# Patient Record
Sex: MALE | Race: OTHER | HISPANIC OR LATINO | URBAN - METROPOLITAN AREA
[De-identification: names, ages, dates, MRNs, and addresses within clinical notes are randomized per-mention and may not be internally consistent; named-entity substitution may affect disease eponyms.]

---

## 2017-02-03 ENCOUNTER — EMERGENCY (EMERGENCY)
Facility: HOSPITAL | Age: 47
LOS: 1 days | End: 2017-02-03
Admitting: EMERGENCY MEDICINE
Payer: COMMERCIAL

## 2017-02-03 PROCEDURE — 99284 EMERGENCY DEPT VISIT MOD MDM: CPT

## 2017-02-03 PROCEDURE — 16020 DRESS/DEBRID P-THICK BURN S: CPT

## 2017-02-03 PROCEDURE — 99285 EMERGENCY DEPT VISIT HI MDM: CPT | Mod: 25

## 2017-09-20 ENCOUNTER — EMERGENCY (EMERGENCY)
Facility: HOSPITAL | Age: 47
LOS: 1 days | Discharge: HOME | End: 2017-09-20
Attending: EMERGENCY MEDICINE | Admitting: EMERGENCY MEDICINE
Payer: COMMERCIAL

## 2017-09-20 ENCOUNTER — TRANSCRIPTION ENCOUNTER (OUTPATIENT)
Age: 47
End: 2017-09-20

## 2017-09-20 VITALS
DIASTOLIC BLOOD PRESSURE: 92 MMHG | RESPIRATION RATE: 18 BRPM | WEIGHT: 179.9 LBS | OXYGEN SATURATION: 98 % | TEMPERATURE: 97 F | HEART RATE: 70 BPM | SYSTOLIC BLOOD PRESSURE: 143 MMHG

## 2017-09-20 DIAGNOSIS — Z90.49 ACQUIRED ABSENCE OF OTHER SPECIFIED PARTS OF DIGESTIVE TRACT: Chronic | ICD-10-CM

## 2017-09-20 PROCEDURE — 99284 EMERGENCY DEPT VISIT MOD MDM: CPT | Mod: 25

## 2017-09-20 NOTE — ED ADULT NURSE NOTE - OBJECTIVE STATEMENT
Patient a + o x 4, c/o headache since 4pm and 1 episode of emesis,  reports history of migraine, denies other medical history.  Patient denies nuchal rigidity and photophobia. Patient oriented to area, made comfortable, safety maintained, will continue to monitor.

## 2017-09-20 NOTE — ED ADULT NURSE NOTE - CHPI ED SYMPTOMS NEG
no change in level of consciousness/no confusion/no loss of consciousness/no blurred vision/no fever/no dizziness/no weakness

## 2017-09-21 VITALS
RESPIRATION RATE: 18 BRPM | HEART RATE: 65 BPM | TEMPERATURE: 98 F | OXYGEN SATURATION: 98 % | SYSTOLIC BLOOD PRESSURE: 116 MMHG | DIASTOLIC BLOOD PRESSURE: 64 MMHG

## 2017-09-21 PROCEDURE — 99284 EMERGENCY DEPT VISIT MOD MDM: CPT | Mod: 25

## 2017-09-21 PROCEDURE — 96375 TX/PRO/DX INJ NEW DRUG ADDON: CPT

## 2017-09-21 PROCEDURE — 96374 THER/PROPH/DIAG INJ IV PUSH: CPT

## 2017-09-21 RX ORDER — SODIUM CHLORIDE 9 MG/ML
1000 INJECTION INTRAMUSCULAR; INTRAVENOUS; SUBCUTANEOUS
Qty: 0 | Refills: 0 | Status: DISCONTINUED | OUTPATIENT
Start: 2017-09-21 | End: 2017-09-24

## 2017-09-21 RX ORDER — DIPHENHYDRAMINE HCL 50 MG
50 CAPSULE ORAL ONCE
Qty: 0 | Refills: 0 | Status: COMPLETED | OUTPATIENT
Start: 2017-09-21 | End: 2017-09-21

## 2017-09-21 RX ORDER — METOCLOPRAMIDE HCL 10 MG
10 TABLET ORAL ONCE
Qty: 0 | Refills: 0 | Status: COMPLETED | OUTPATIENT
Start: 2017-09-21 | End: 2017-09-21

## 2017-09-21 RX ADMIN — Medication 10 MILLIGRAM(S): at 01:23

## 2017-09-21 RX ADMIN — Medication 50 MILLIGRAM(S): at 01:21

## 2017-09-21 RX ADMIN — SODIUM CHLORIDE 100 MILLILITER(S): 9 INJECTION INTRAMUSCULAR; INTRAVENOUS; SUBCUTANEOUS at 01:00

## 2017-09-21 NOTE — ED PROVIDER NOTE - MEDICAL DECISION MAKING DETAILS
Female patient with complaints of headache consistent with previous migraine episodes. Neuro exam is unremarkable. Will treat for migraine headache and re-evaluate after treatment.

## 2017-09-21 NOTE — ED PROVIDER NOTE - OBJECTIVE STATEMENT
47 y/o female patient with past hx migraine headaches, brought to ED due to headache consistent with prior episodes of migraine headaches. Patient denies any vision or gait problems, fever or stiff neck since symptoms started. She describes occasional vomits since symptom started, which also occurs with her usual migraine headaches.

## 2017-09-21 NOTE — ED PROVIDER NOTE - PHYSICAL EXAMINATION
Gen: AAOX3, NAD, uncomfortable  HEENT: PERRL, EOMI, MOM, supple neck, negative Kernig and Brudzinski signs, no meningismus  Chest: RRR, CTAX2  Abd: BS+, nontender to palpation  Ext: no edema or cyanosis  Neuro: no gross deficits

## 2017-09-21 NOTE — ED PROVIDER NOTE - PROGRESS NOTE DETAILS
Patient was re-evaluated, found in no acute distress, calm, speaking in complete sentences, describing marked improvement of symptoms following treatment at ED. Patient ambulated without difficulty and tolerated oral intake adequately at ED. Recommendations for rest and follow-up with PMD in 48-72 hours were given to patient. Patient is stable of discharge. Will discharge home. Dr. Bishop Parks

## 2017-09-21 NOTE — ED PROVIDER NOTE - CARE PLAN
Principal Discharge DX:	Nonintractable migraine, unspecified migraine type  Instructions for follow-up, activity and diet:	follow-up with PMD in 48-72 hours

## 2017-09-24 ENCOUNTER — TRANSCRIPTION ENCOUNTER (OUTPATIENT)
Age: 47
End: 2017-09-24

## 2018-05-24 ENCOUNTER — EMERGENCY (EMERGENCY)
Facility: HOSPITAL | Age: 48
LOS: 1 days | Discharge: HOME | End: 2018-05-24
Attending: EMERGENCY MEDICINE | Admitting: EMERGENCY MEDICINE
Payer: COMMERCIAL

## 2018-05-24 VITALS
SYSTOLIC BLOOD PRESSURE: 132 MMHG | HEART RATE: 73 BPM | TEMPERATURE: 98 F | RESPIRATION RATE: 16 BRPM | OXYGEN SATURATION: 98 % | DIASTOLIC BLOOD PRESSURE: 82 MMHG

## 2018-05-24 VITALS
DIASTOLIC BLOOD PRESSURE: 69 MMHG | TEMPERATURE: 99 F | OXYGEN SATURATION: 100 % | SYSTOLIC BLOOD PRESSURE: 144 MMHG | HEART RATE: 88 BPM | WEIGHT: 179.9 LBS | HEIGHT: 65 IN | RESPIRATION RATE: 16 BRPM

## 2018-05-24 DIAGNOSIS — Z90.49 ACQUIRED ABSENCE OF OTHER SPECIFIED PARTS OF DIGESTIVE TRACT: Chronic | ICD-10-CM

## 2018-05-24 PROCEDURE — 73140 X-RAY EXAM OF FINGER(S): CPT

## 2018-05-24 PROCEDURE — 73140 X-RAY EXAM OF FINGER(S): CPT | Mod: 26,RT

## 2018-05-24 PROCEDURE — 99283 EMERGENCY DEPT VISIT LOW MDM: CPT | Mod: 25

## 2018-05-24 PROCEDURE — 29130 APPL FINGER SPLINT STATIC: CPT | Mod: F9

## 2018-05-24 PROCEDURE — 29130 APPL FINGER SPLINT STATIC: CPT

## 2018-05-24 RX ORDER — IBUPROFEN 200 MG
600 TABLET ORAL ONCE
Qty: 0 | Refills: 0 | Status: COMPLETED | OUTPATIENT
Start: 2018-05-24 | End: 2018-05-24

## 2018-05-24 RX ADMIN — Medication 600 MILLIGRAM(S): at 15:32

## 2018-05-24 NOTE — ED ADULT NURSE NOTE - RELIEVING FACTORS
Advancement Flap (Single) Text: The defect edges were debeveled with a #15 scalpel blade.  Given the location of the defect and the proximity to free margins a single advancement flap was deemed most appropriate.  Using a sterile surgical marker, an appropriate advancement flap was drawn incorporating the defect and placing the expected incisions within the relaxed skin tension lines where possible.    The area thus outlined was incised deep to adipose tissue with a #15 scalpel blade.  The skin margins were undermined to an appropriate distance in all directions utilizing iris scissors. rest

## 2018-05-24 NOTE — ED PROVIDER NOTE - OBJECTIVE STATEMENT
46 y/o F pt w/ PMHx migraines presents to the ED c/o R 5th finger pain s/p injury today. 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 Pt denies other injury, numbness, tingling or any other complaints at this time.

## 2018-05-24 NOTE — ED ADULT NURSE NOTE - OBJECTIVE STATEMENT
Pt presents with complaint of injury to right 5th finger on a chair prior to arrival. Swelling ecchymosis tenderness noted right 5th digit. Denies other injuries

## 2018-08-08 PROBLEM — G43.909 MIGRAINE, UNSPECIFIED, NOT INTRACTABLE, WITHOUT STATUS MIGRAINOSUS: Chronic | Status: ACTIVE | Noted: 2017-09-20

## 2018-08-20 ENCOUNTER — APPOINTMENT (OUTPATIENT)
Dept: ORTHOPEDIC SURGERY | Facility: CLINIC | Age: 48
End: 2018-08-20
Payer: COMMERCIAL

## 2018-08-20 VITALS — HEIGHT: 65 IN | BODY MASS INDEX: 29.99 KG/M2 | WEIGHT: 180 LBS

## 2018-08-20 PROCEDURE — 72100 X-RAY EXAM L-S SPINE 2/3 VWS: CPT

## 2018-08-20 PROCEDURE — 99204 OFFICE O/P NEW MOD 45 MIN: CPT

## 2018-09-08 ENCOUNTER — RESULT REVIEW (OUTPATIENT)
Age: 48
End: 2018-09-08

## 2018-10-23 ENCOUNTER — APPOINTMENT (OUTPATIENT)
Dept: ULTRASOUND IMAGING | Facility: CLINIC | Age: 48
End: 2018-10-23
Payer: COMMERCIAL

## 2018-10-23 ENCOUNTER — OUTPATIENT (OUTPATIENT)
Dept: OUTPATIENT SERVICES | Facility: HOSPITAL | Age: 48
LOS: 1 days | End: 2018-10-23
Payer: COMMERCIAL

## 2018-10-23 DIAGNOSIS — Z00.8 ENCOUNTER FOR OTHER GENERAL EXAMINATION: ICD-10-CM

## 2018-10-23 DIAGNOSIS — Z90.49 ACQUIRED ABSENCE OF OTHER SPECIFIED PARTS OF DIGESTIVE TRACT: Chronic | ICD-10-CM

## 2018-10-23 PROCEDURE — 76856 US EXAM PELVIC COMPLETE: CPT

## 2018-10-23 PROCEDURE — 76856 US EXAM PELVIC COMPLETE: CPT | Mod: 26

## 2018-10-23 PROCEDURE — 76830 TRANSVAGINAL US NON-OB: CPT

## 2018-10-23 PROCEDURE — 76830 TRANSVAGINAL US NON-OB: CPT | Mod: 26

## 2019-02-03 ENCOUNTER — TRANSCRIPTION ENCOUNTER (OUTPATIENT)
Age: 49
End: 2019-02-03

## 2019-06-21 ENCOUNTER — APPOINTMENT (OUTPATIENT)
Dept: GASTROENTEROLOGY | Facility: CLINIC | Age: 49
End: 2019-06-21

## 2019-09-03 ENCOUNTER — OUTPATIENT (OUTPATIENT)
Dept: OUTPATIENT SERVICES | Facility: HOSPITAL | Age: 49
LOS: 1 days | End: 2019-09-03
Payer: COMMERCIAL

## 2019-09-03 ENCOUNTER — APPOINTMENT (OUTPATIENT)
Dept: MAMMOGRAPHY | Facility: IMAGING CENTER | Age: 49
End: 2019-09-03
Payer: COMMERCIAL

## 2019-09-03 DIAGNOSIS — Z90.49 ACQUIRED ABSENCE OF OTHER SPECIFIED PARTS OF DIGESTIVE TRACT: Chronic | ICD-10-CM

## 2019-09-03 DIAGNOSIS — Z00.8 ENCOUNTER FOR OTHER GENERAL EXAMINATION: ICD-10-CM

## 2019-09-03 PROCEDURE — 77067 SCR MAMMO BI INCL CAD: CPT | Mod: 26

## 2019-09-03 PROCEDURE — 77063 BREAST TOMOSYNTHESIS BI: CPT

## 2019-09-03 PROCEDURE — 77063 BREAST TOMOSYNTHESIS BI: CPT | Mod: 26

## 2019-09-03 PROCEDURE — 77067 SCR MAMMO BI INCL CAD: CPT

## 2019-11-14 ENCOUNTER — APPOINTMENT (OUTPATIENT)
Dept: PULMONOLOGY | Facility: CLINIC | Age: 49
End: 2019-11-14
Payer: COMMERCIAL

## 2019-11-14 VITALS
SYSTOLIC BLOOD PRESSURE: 141 MMHG | HEIGHT: 65 IN | BODY MASS INDEX: 29.99 KG/M2 | TEMPERATURE: 98 F | DIASTOLIC BLOOD PRESSURE: 93 MMHG | WEIGHT: 180 LBS | HEART RATE: 71 BPM | RESPIRATION RATE: 16 BRPM | OXYGEN SATURATION: 96 %

## 2019-11-14 DIAGNOSIS — Z80.3 FAMILY HISTORY OF MALIGNANT NEOPLASM OF BREAST: ICD-10-CM

## 2019-11-14 DIAGNOSIS — Z80.0 FAMILY HISTORY OF MALIGNANT NEOPLASM OF DIGESTIVE ORGANS: ICD-10-CM

## 2019-11-14 LAB — POCT - HEMOGLOBIN (HGB), QUANTITATIVE, TRANSCUTANEOUS: 16.5

## 2019-11-14 PROCEDURE — 95012 NITRIC OXIDE EXP GAS DETER: CPT

## 2019-11-14 PROCEDURE — 88738 HGB QUANT TRANSCUTANEOUS: CPT

## 2019-11-14 PROCEDURE — 94060 EVALUATION OF WHEEZING: CPT

## 2019-11-14 PROCEDURE — 99244 OFF/OP CNSLTJ NEW/EST MOD 40: CPT | Mod: 25

## 2019-11-14 PROCEDURE — 94729 DIFFUSING CAPACITY: CPT

## 2019-11-14 PROCEDURE — 71046 X-RAY EXAM CHEST 2 VIEWS: CPT

## 2019-11-14 PROCEDURE — 94727 GAS DIL/WSHOT DETER LNG VOL: CPT

## 2019-11-14 NOTE — PROCEDURE
[FreeTextEntry1] : PFT: restricted without a significant bronchodilator response.  Lung volumes low with evidence of air trapping. DLCO normal when corrected for volumes.\par \par Exhaled nitric oxide: normal\par \par CT Chest report in chart reviewed.\par \par CXR: elevated right hemidiaphragm, linear density RUL, no pleural effusions, cardiac silhouette appears normal.  No bony abnormality.\par \par Prior exams reviewed:\par EXAM: CHEST PA & LAT \par PROCEDURE DATE: Oct 1 2014 \par INTERPRETATION: Priors: January 15, 2013 \par Clinical history is cough. \par TECHNIQUE: PA and lateral views of the chest were obtained. \par FINDINGS: The cardiac and mediastinal silhouettes are within normal limits. \par Calcifications seen in the periphery of the right upper lobe overlying the \par posterior right sixth rib which is stable. There are no effusions \par The lungs are clear. \par IMPRESSION: Clear lungs. \par SANDRA YOUNG M.D., ATTENDING RADIOLOGIST \par This examination was interpreted on: Oct 2 2014 4:16PM. This document \par has been electronically signed. Oct 2 2014 4:19PM \par

## 2019-11-14 NOTE — PHYSICAL EXAM
[Well Groomed] : well groomed [General Appearance - In No Acute Distress] : no acute distress [Neck Appearance] : the appearance of the neck was normal [] : no respiratory distress [Respiration, Rhythm And Depth] : normal respiratory rhythm and effort [Heart Sounds] : normal S1 and S2 [Exaggerated Use Of Accessory Muscles For Inspiration] : no accessory muscle use [Auscultation Breath Sounds / Voice Sounds] : lungs were clear to auscultation bilaterally [Nail Clubbing] : no clubbing of the fingernails [Cyanosis, Localized] : no localized cyanosis

## 2019-11-14 NOTE — HISTORY OF PRESENT ILLNESS
[FreeTextEntry1] : 48F for the past 2 weeks feeling short of breath.  Worse when laying down at night, also while walking around.  Breathing is noisey.  No cough.  Constant runny nose.  Has post nasal drip and very congested in the am.  This has happened in the past and has resolved without treatment.   Notices that during cold seasons gets a lot of "chest problems".  Notices that throat feels weird at work when using sterilization chemicals.   Denies any recent leg swelling, change in weight, long car/train/plane rides.  \par \par Had a chest xray then ct chest which showed elevated R hemidiaphragm new since 2016, no masses, 3mm pulm nodule.   no intrathoracic surgery, denies trauma to chest/back, cholecystectomy was 17 years ago, chronic sciatica, no neck pain.  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

## 2019-11-14 NOTE — ASSESSMENT
[FreeTextEntry1] : dyspnea related to atelectasis from hemidiaphragm elevated.  Unclear why this has happened.  will send for sniff test.\par \par reassess in 1 month\par \par discussed possibly initiating CPAP if no improvement or surgical evaluation

## 2019-11-14 NOTE — CONSULT LETTER
[FreeTextEntry1] : Dear ,\par \par I had the pleasure of evaluating your patient, DREAD ERVIN today in pulmonary consultation.  Please refer to my attached note for my findings and recommendations.\par \par \par Thank you for allowing me to participate in the care of your patient, please feel free to call with any questions or concerns.\par \par \par Sincerely,\par \par Augustina Tucker MD\par [facility] \par [facility]\par \par

## 2019-12-12 ENCOUNTER — APPOINTMENT (OUTPATIENT)
Dept: PULMONOLOGY | Facility: CLINIC | Age: 49
End: 2019-12-12
Payer: COMMERCIAL

## 2019-12-12 ENCOUNTER — LABORATORY RESULT (OUTPATIENT)
Age: 49
End: 2019-12-12

## 2019-12-12 VITALS
TEMPERATURE: 98.2 F | SYSTOLIC BLOOD PRESSURE: 130 MMHG | DIASTOLIC BLOOD PRESSURE: 85 MMHG | HEART RATE: 56 BPM | RESPIRATION RATE: 16 BRPM | OXYGEN SATURATION: 97 %

## 2019-12-12 DIAGNOSIS — R06.02 SHORTNESS OF BREATH: ICD-10-CM

## 2019-12-12 DIAGNOSIS — J98.6 DISORDERS OF DIAPHRAGM: ICD-10-CM

## 2019-12-12 PROCEDURE — 36415 COLL VENOUS BLD VENIPUNCTURE: CPT

## 2019-12-12 PROCEDURE — 71046 X-RAY EXAM CHEST 2 VIEWS: CPT

## 2019-12-12 PROCEDURE — 99214 OFFICE O/P EST MOD 30 MIN: CPT | Mod: 25

## 2019-12-12 NOTE — REVIEW OF SYSTEMS
[As Noted in HPI] : as noted in HPI [Negative] : Constitutional [Chest Discomfort] : chest discomfort

## 2019-12-12 NOTE — HISTORY OF PRESENT ILLNESS
[FreeTextEntry1] : fu for elevated right hemidiaphragm, went for sniff test, appears paralyzed\par still getting winded with stairs\par has a left upper abdominal discomfort worse with laying down and deep breathing.  No abnormality seen on the chest CT in this area.\par \par will be going for bilateral mastectomy in January (transgender, making transition female-->male) wants to know if it is safe to proceed.

## 2019-12-12 NOTE — CONSULT LETTER
[FreeTextEntry1] : Dear ,\par \par I had the pleasure of evaluating your patient, DREAD ERVIN today in pulmonary consultation.  Please refer to my attached note for my findings and recommendations.\par \par \par Thank you for allowing me to participate in the care of your patient, please feel free to call with any questions or concerns.\par \par \par Sincerely,\par \par Augustina Tucker MD\par [facility] \par [facility]\par \par

## 2019-12-12 NOTE — PROCEDURE
[FreeTextEntry1] : CXR: still with elevated right hemidiaphragm\par \par Prior exams reviewed:\par \par sniff test 11/27/19: right hemidiaphragm paralysis\par \par PFT: restricted without a significant bronchodilator response.  Lung volumes low with evidence of air trapping. DLCO normal when corrected for volumes.\par \par Exhaled nitric oxide: normal\par \par CT Chest report in chart reviewed.\par \par CXR: elevated right hemidiaphragm, linear density RUL, no pleural effusions, cardiac silhouette appears normal.  No bony abnormality.\par \par Prior exams reviewed:\par EXAM: CHEST PA & LAT \par PROCEDURE DATE: Oct 1 2014 \par INTERPRETATION: Priors: January 15, 2013 \par Clinical history is cough. \par TECHNIQUE: PA and lateral views of the chest were obtained. \par FINDINGS: The cardiac and mediastinal silhouettes are within normal limits. \par Calcifications seen in the periphery of the right upper lobe overlying the \par posterior right sixth rib which is stable. There are no effusions \par The lungs are clear. \par IMPRESSION: Clear lungs. \par SANDRA YOUNG M.D., ATTENDING RADIOLOGIST \par This examination was interpreted on: Oct 2 2014 4:16PM. This document \par has been electronically signed. Oct 2 2014 4:19PM \par

## 2019-12-12 NOTE — ASSESSMENT
[FreeTextEntry1] : etiology of diaphragm paralysis unknown.\par check rheum panel, check MRI neck.\par secondary to viral illness?\par \par lower chest/upper abdominal pain--no abnormality of upper abdomen seen on ct chest, check ddimer r/o PE.\par \par No absolute pulmonary contraindication to proceed with mastectomy.  Consider extubation to bipap to assist in ventilation post operatively.

## 2019-12-12 NOTE — REASON FOR VISIT
[Abnormal Chest X-Ray] : abnormal Chest X-Ray [Abnormal CT Scan] : abnormal CT Scan [Follow-Up] : a follow-up visit

## 2019-12-12 NOTE — PHYSICAL EXAM
[Well Groomed] : well groomed [General Appearance - In No Acute Distress] : no acute distress [Heart Sounds] : normal S1 and S2 [] : no respiratory distress [Neck Appearance] : the appearance of the neck was normal [Nail Clubbing] : no clubbing of the fingernails [Exaggerated Use Of Accessory Muscles For Inspiration] : no accessory muscle use [Auscultation Breath Sounds / Voice Sounds] : lungs were clear to auscultation bilaterally [Respiration, Rhythm And Depth] : normal respiratory rhythm and effort [Cyanosis, Localized] : no localized cyanosis

## 2019-12-13 LAB
ANA SER IF-ACNC: NEGATIVE
ANACR T: NEGATIVE
CK SERPL-CCNC: 96 U/L
CRP SERPL-MCNC: 0.45 MG/DL
DEPRECATED D DIMER PPP IA-ACNC: <150 NG/ML DDU
ERYTHROCYTE [SEDIMENTATION RATE] IN BLOOD BY WESTERGREN METHOD: 18 MM/HR
RHEUMATOID FACT SER QL: <10 IU/ML

## 2019-12-17 LAB — MPO AB + PR3 PNL SER: NORMAL

## 2019-12-27 ENCOUNTER — OUTPATIENT (OUTPATIENT)
Dept: OUTPATIENT SERVICES | Facility: HOSPITAL | Age: 49
LOS: 1 days | End: 2019-12-27

## 2019-12-27 VITALS
HEIGHT: 63 IN | RESPIRATION RATE: 16 BRPM | TEMPERATURE: 97 F | WEIGHT: 182.98 LBS | HEART RATE: 64 BPM | SYSTOLIC BLOOD PRESSURE: 140 MMHG | OXYGEN SATURATION: 98 % | DIASTOLIC BLOOD PRESSURE: 84 MMHG

## 2019-12-27 DIAGNOSIS — F64.0 TRANSSEXUALISM: ICD-10-CM

## 2019-12-27 DIAGNOSIS — J98.6 DISORDERS OF DIAPHRAGM: ICD-10-CM

## 2019-12-27 DIAGNOSIS — G47.33 OBSTRUCTIVE SLEEP APNEA (ADULT) (PEDIATRIC): ICD-10-CM

## 2019-12-27 DIAGNOSIS — Z90.49 ACQUIRED ABSENCE OF OTHER SPECIFIED PARTS OF DIGESTIVE TRACT: Chronic | ICD-10-CM

## 2019-12-27 DIAGNOSIS — T78.40XA ALLERGY, UNSPECIFIED, INITIAL ENCOUNTER: ICD-10-CM

## 2019-12-27 LAB
ANION GAP SERPL CALC-SCNC: 11 MMO/L — SIGNIFICANT CHANGE UP (ref 7–14)
BUN SERPL-MCNC: 9 MG/DL — SIGNIFICANT CHANGE UP (ref 7–23)
CALCIUM SERPL-MCNC: 9.5 MG/DL — SIGNIFICANT CHANGE UP (ref 8.4–10.5)
CHLORIDE SERPL-SCNC: 101 MMOL/L — SIGNIFICANT CHANGE UP (ref 98–107)
CO2 SERPL-SCNC: 28 MMOL/L — SIGNIFICANT CHANGE UP (ref 22–31)
CREAT SERPL-MCNC: 0.76 MG/DL — SIGNIFICANT CHANGE UP (ref 0.5–1.3)
GLUCOSE SERPL-MCNC: 84 MG/DL — SIGNIFICANT CHANGE UP (ref 70–99)
HCG SERPL-ACNC: < 5 MIU/ML — SIGNIFICANT CHANGE UP
HCT VFR BLD CALC: 53.4 % — HIGH (ref 34.5–45)
HGB BLD-MCNC: 16.8 G/DL — HIGH (ref 11.5–15.5)
MCHC RBC-ENTMCNC: 27.5 PG — SIGNIFICANT CHANGE UP (ref 27–34)
MCHC RBC-ENTMCNC: 31.5 % — LOW (ref 32–36)
MCV RBC AUTO: 87.5 FL — SIGNIFICANT CHANGE UP (ref 80–100)
NRBC # FLD: 0 K/UL — SIGNIFICANT CHANGE UP (ref 0–0)
PLATELET # BLD AUTO: 313 K/UL — SIGNIFICANT CHANGE UP (ref 150–400)
PMV BLD: 11.3 FL — SIGNIFICANT CHANGE UP (ref 7–13)
POTASSIUM SERPL-MCNC: 4.4 MMOL/L — SIGNIFICANT CHANGE UP (ref 3.5–5.3)
POTASSIUM SERPL-SCNC: 4.4 MMOL/L — SIGNIFICANT CHANGE UP (ref 3.5–5.3)
RBC # BLD: 6.1 M/UL — HIGH (ref 3.8–5.2)
RBC # FLD: 14 % — SIGNIFICANT CHANGE UP (ref 10.3–14.5)
SODIUM SERPL-SCNC: 140 MMOL/L — SIGNIFICANT CHANGE UP (ref 135–145)
WBC # BLD: 10.82 K/UL — HIGH (ref 3.8–10.5)
WBC # FLD AUTO: 10.82 K/UL — HIGH (ref 3.8–10.5)

## 2019-12-27 NOTE — H&P PST ADULT - NSICDXPROBLEM_GEN_ALL_CORE_FT
PROBLEM DIAGNOSES  Problem: Gender identity disorder in adult  Assessment and Plan:     Problem: Paralysis, diaphragm  Assessment and Plan: PROBLEM DIAGNOSES  Problem: Gender identity disorder in adult  Assessment and Plan: Bilateral Breast Subcutaneous mastectomy   Pre op instructions including Hibiclens with teach back reviewed with pt ; pt verbalized good understanding of pre op instructions     Problem: Paralysis, diaphragm  Assessment and Plan: Request Pulmonology evaluation ; PFT ; MRI [ scheduled 5/28/19 }  To review with anesthesia pre op 12/30/19    Problem: REGINE (obstructive sleep apnea)  Assessment and Plan: REGINE Precautions  OR booking notified via fax    Problem: Allergy  Assessment and Plan: Allergy ; Fish  OR booking notified via fax PROBLEM DIAGNOSES  Problem: Gender identity disorder in adult  Assessment and Plan: Bilateral Breast Subcutaneous Mastectomy   Pre op instructions including Hibiclens with teach back reviewed with pt ; pt verbalized good understanding of pre op instructions     Problem: Paralysis, diaphragm  Assessment and Plan: Request Pulmonology evaluation ; PFT ; MRI [ scheduled 12/28/19 }  Request Cardiac evaluation 10/19 with echo   To review with anesthesia pre op 12/30/19    Problem: REGINE (obstructive sleep apnea)  Assessment and Plan: REGINE Precautions  OR booking notified via fax    Problem: Allergy  Assessment and Plan: Allergy ; Fish  OR booking notified via fax PROBLEM DIAGNOSES  Problem: Gender identity disorder in adult  Assessment and Plan: Bilateral Breast Subcutaneous Mastectomy   Pre op instructions including Hibiclens with teach back reviewed with pt ; pt verbalized good understanding of pre op instructions     Problem: Paralysis, diaphragm  Assessment and Plan: Request Pulmonology evaluation ; PFT ; MRI [ scheduled 12/28/19 }  Request Cardiac evaluation 10/19 with echo   To review with anesthesia pre op 12/30/19  Reviewed with Dr Marshall     Problem: REGINE (obstructive sleep apnea)  Assessment and Plan: REGINE Precautions  OR booking notified via fax    Problem: Allergy  Assessment and Plan: Allergy ; Fish  OR booking notified via fax

## 2019-12-27 NOTE — H&P PST ADULT - SYMPTOMS
sob on exertion pt denies cp, denies palpitations +sob on exertion pt denies cp, denies palpitations able to climb 1 flight stairs ; walk 3 blocks.' Pulmonary w/u in progress . to cardiology for evaluation 10/19 requested on chart/dyspnea

## 2019-12-27 NOTE — H&P PST ADULT - LYMPHATIC
supraclavicular R/inguinal R/supraclavicular L/posterior cervical L/anterior cervical L/posterior cervical R/anterior cervical R

## 2019-12-27 NOTE — H&P PST ADULT - NSANTHOSAYNRD_GEN_A_CORE
No. REGINE screening performed.  STOP BANG Legend: 0-2 = LOW Risk; 3-4 = INTERMEDIATE Risk; 5-8 = HIGH Risk

## 2019-12-27 NOTE — H&P PST ADULT - NSICDXPASTMEDICALHX_GEN_ALL_CORE_FT
PAST MEDICAL HISTORY:  GERD (gastroesophageal reflux disease)     Hypertension     Hypothyroidism     Migraine     Paralysis of diaphragm Left ; w/u Dr Pereyra  PAST MEDICAL HISTORY:  GERD (gastroesophageal reflux disease)     Hypertension     Hypothyroidism     Migraine     Paralysis of diaphragm Left ; w/u Dr Tucker  [phone] PAST MEDICAL HISTORY:  GERD (gastroesophageal reflux disease)     Hypertension     Hypothyroidism     Migraine     Paralysis of diaphragm Right  ; w/u Dr Tucker  [phone]

## 2019-12-27 NOTE — H&P PST ADULT - HISTORY OF PRESENT ILLNESS
Pt is a 49 y.o. female ; pt states beginning 2/19 ; pt is transgender; pt tx testosterone  pt to surgeo ; pt now presents for Bilateral Breast Subcutaneous Mastectomy Pt is a 49 y.o. female ; pt states beginning 2/19 ; pt is transgender pt ; pt tx testosterone  pt to surgeon ; pt now presents for Bilateral Breast Subcutaneous Mastectomy

## 2020-01-02 ENCOUNTER — TRANSCRIPTION ENCOUNTER (OUTPATIENT)
Age: 50
End: 2020-01-02

## 2020-01-03 ENCOUNTER — OUTPATIENT (OUTPATIENT)
Dept: OUTPATIENT SERVICES | Facility: HOSPITAL | Age: 50
LOS: 1 days | Discharge: HOME | End: 2020-01-03
Payer: COMMERCIAL

## 2020-01-03 ENCOUNTER — RESULT REVIEW (OUTPATIENT)
Age: 50
End: 2020-01-03

## 2020-01-03 VITALS
RESPIRATION RATE: 17 BRPM | HEART RATE: 78 BPM | SYSTOLIC BLOOD PRESSURE: 115 MMHG | DIASTOLIC BLOOD PRESSURE: 61 MMHG | TEMPERATURE: 98 F | OXYGEN SATURATION: 98 %

## 2020-01-03 VITALS
TEMPERATURE: 98 F | WEIGHT: 182.98 LBS | HEART RATE: 59 BPM | OXYGEN SATURATION: 98 % | DIASTOLIC BLOOD PRESSURE: 92 MMHG | HEIGHT: 63 IN | RESPIRATION RATE: 20 BRPM | SYSTOLIC BLOOD PRESSURE: 143 MMHG

## 2020-01-03 DIAGNOSIS — F64.0 TRANSSEXUALISM: ICD-10-CM

## 2020-01-03 DIAGNOSIS — Z90.49 ACQUIRED ABSENCE OF OTHER SPECIFIED PARTS OF DIGESTIVE TRACT: Chronic | ICD-10-CM

## 2020-01-03 PROCEDURE — 88305 TISSUE EXAM BY PATHOLOGIST: CPT | Mod: 26

## 2020-01-03 RX ORDER — OXYCODONE HYDROCHLORIDE 5 MG/1
1 TABLET ORAL
Qty: 12 | Refills: 0
Start: 2020-01-03 | End: 2020-01-07

## 2020-01-03 NOTE — ASU DISCHARGE PLAN (ADULT/PEDIATRIC) - CALL YOUR DOCTOR IF YOU HAVE ANY OF THE FOLLOWING:
Numbness, tingling, color or temperature change to extremity/Bleeding that does not stop/Pain not relieved by Medications/Swelling that gets worse/Wound/Surgical Site with redness, or foul smelling discharge or pus Pain not relieved by Medications/Bleeding that does not stop/Numbness, tingling, color or temperature change to extremity/Inability to tolerate liquids or foods/Fever greater than (need to indicate Fahrenheit or Celsius)/Nausea and vomiting that does not stop/Unable to urinate/Swelling that gets worse/Wound/Surgical Site with redness, or foul smelling discharge or pus

## 2020-01-03 NOTE — ASU DISCHARGE PLAN (ADULT/PEDIATRIC) - ASU DC SPECIAL INSTRUCTIONSFT
Keep dressing and binder on until follow-up. Please call to see Dr. Mccormick within the next few days. Please take antibiotic as prescribed and pain medication as needed.

## 2020-01-03 NOTE — ASU DISCHARGE PLAN (ADULT/PEDIATRIC) - PHYSICIAN SECTION COMPLETE
Call received from patients daughter Hanna.  Hanna states YVON Lay had recommended a cardiology provider in [place] at last OV, Hanna is unable to recall the provider that was recommended.  Names given of cardiology providers in [place], Hanna states that none of the names provided sound familiar and asked that message be sent to YVON Mcgregor.     Will route message to Meche for recommendations.    Yes

## 2020-01-03 NOTE — ASU DISCHARGE PLAN (ADULT/PEDIATRIC) - PAIN MANAGEMENT
Prescriptions electronically submitted to pharmacy from [facility] No tylenol until after 7:30pm/Prescriptions electronically submitted to pharmacy from [facility]

## 2020-01-08 LAB — SURGICAL PATHOLOGY STUDY: SIGNIFICANT CHANGE UP

## 2020-04-25 ENCOUNTER — MESSAGE (OUTPATIENT)
Age: 50
End: 2020-04-25

## 2020-05-05 LAB
SARS-COV-2 IGG SERPL IA-ACNC: <0.1 INDEX
SARS-COV-2 IGG SERPL QL IA: NEGATIVE

## 2020-07-20 NOTE — ED PROVIDER NOTE - ENMT NEGATIVE STATEMENT, MLM
Spoke to pt and informed that will have lab orders sent over to [facility]. Pt verbalized understanding.   Ears: no ear pain and no hearing problems.Nose: no nasal congestion and no nasal drainage.Mouth/Throat: no dysphagia, no hoarseness and no throat pain.Neck: no lumps, no pain, no stiffness and no swollen glands.

## 2021-02-14 ENCOUNTER — EMERGENCY (EMERGENCY)
Facility: HOSPITAL | Age: 51
LOS: 1 days | Discharge: HOME | End: 2021-02-14
Attending: EMERGENCY MEDICINE
Payer: COMMERCIAL

## 2021-02-14 VITALS
HEART RATE: 68 BPM | OXYGEN SATURATION: 100 % | RESPIRATION RATE: 15 BRPM | SYSTOLIC BLOOD PRESSURE: 146 MMHG | DIASTOLIC BLOOD PRESSURE: 86 MMHG | TEMPERATURE: 98 F

## 2021-02-14 VITALS
RESPIRATION RATE: 18 BRPM | SYSTOLIC BLOOD PRESSURE: 182 MMHG | DIASTOLIC BLOOD PRESSURE: 83 MMHG | WEIGHT: 169.98 LBS | HEART RATE: 72 BPM | TEMPERATURE: 98 F | OXYGEN SATURATION: 98 % | HEIGHT: 63 IN

## 2021-02-14 DIAGNOSIS — Z90.49 ACQUIRED ABSENCE OF OTHER SPECIFIED PARTS OF DIGESTIVE TRACT: Chronic | ICD-10-CM

## 2021-02-14 PROBLEM — K21.9 GASTRO-ESOPHAGEAL REFLUX DISEASE WITHOUT ESOPHAGITIS: Chronic | Status: ACTIVE | Noted: 2019-12-27

## 2021-02-14 PROBLEM — I10 ESSENTIAL (PRIMARY) HYPERTENSION: Chronic | Status: ACTIVE | Noted: 2019-12-27

## 2021-02-14 PROBLEM — E03.9 HYPOTHYROIDISM, UNSPECIFIED: Chronic | Status: ACTIVE | Noted: 2019-12-27

## 2021-02-14 LAB
ALBUMIN SERPL ELPH-MCNC: 4 G/DL — SIGNIFICANT CHANGE UP (ref 3.3–5)
ALP SERPL-CCNC: 78 U/L — SIGNIFICANT CHANGE UP (ref 40–120)
ALT FLD-CCNC: 22 U/L — SIGNIFICANT CHANGE UP (ref 10–45)
ANION GAP SERPL CALC-SCNC: 9 MMOL/L — SIGNIFICANT CHANGE UP (ref 5–17)
AST SERPL-CCNC: 27 U/L — SIGNIFICANT CHANGE UP (ref 10–40)
BASOPHILS # BLD AUTO: 0.04 K/UL — SIGNIFICANT CHANGE UP (ref 0–0.2)
BASOPHILS NFR BLD AUTO: 0.4 % — SIGNIFICANT CHANGE UP (ref 0–2)
BILIRUB SERPL-MCNC: 0.5 MG/DL — SIGNIFICANT CHANGE UP (ref 0.2–1.2)
BUN SERPL-MCNC: 10 MG/DL — SIGNIFICANT CHANGE UP (ref 7–23)
CALCIUM SERPL-MCNC: 9 MG/DL — SIGNIFICANT CHANGE UP (ref 8.4–10.5)
CHLORIDE SERPL-SCNC: 106 MMOL/L — SIGNIFICANT CHANGE UP (ref 96–108)
CO2 SERPL-SCNC: 27 MMOL/L — SIGNIFICANT CHANGE UP (ref 22–31)
CREAT SERPL-MCNC: 0.83 MG/DL — SIGNIFICANT CHANGE UP (ref 0.5–1.3)
CRP SERPL-MCNC: <0.3 MG/DL — SIGNIFICANT CHANGE UP (ref 0–0.4)
EOSINOPHIL # BLD AUTO: 0.22 K/UL — SIGNIFICANT CHANGE UP (ref 0–0.5)
EOSINOPHIL NFR BLD AUTO: 2.3 % — SIGNIFICANT CHANGE UP (ref 0–6)
ERYTHROCYTE [SEDIMENTATION RATE] IN BLOOD: 3 MM/HR — SIGNIFICANT CHANGE UP (ref 0–20)
GLUCOSE SERPL-MCNC: 82 MG/DL — SIGNIFICANT CHANGE UP (ref 70–99)
HCT VFR BLD CALC: 49.6 % — HIGH (ref 34.5–45)
HGB BLD-MCNC: 16.6 G/DL — HIGH (ref 11.5–15.5)
IMM GRANULOCYTES NFR BLD AUTO: 0.4 % — SIGNIFICANT CHANGE UP (ref 0–1.5)
LYMPHOCYTES # BLD AUTO: 3.34 K/UL — HIGH (ref 1–3.3)
LYMPHOCYTES # BLD AUTO: 34.5 % — SIGNIFICANT CHANGE UP (ref 13–44)
MCHC RBC-ENTMCNC: 30.6 PG — SIGNIFICANT CHANGE UP (ref 27–34)
MCHC RBC-ENTMCNC: 33.5 GM/DL — SIGNIFICANT CHANGE UP (ref 32–36)
MCV RBC AUTO: 91.5 FL — SIGNIFICANT CHANGE UP (ref 80–100)
MONOCYTES # BLD AUTO: 0.85 K/UL — SIGNIFICANT CHANGE UP (ref 0–0.9)
MONOCYTES NFR BLD AUTO: 8.8 % — SIGNIFICANT CHANGE UP (ref 2–14)
NEUTROPHILS # BLD AUTO: 5.2 K/UL — SIGNIFICANT CHANGE UP (ref 1.8–7.4)
NEUTROPHILS NFR BLD AUTO: 53.6 % — SIGNIFICANT CHANGE UP (ref 43–77)
NRBC # BLD: 0 /100 WBCS — SIGNIFICANT CHANGE UP (ref 0–0)
PLATELET # BLD AUTO: 234 K/UL — SIGNIFICANT CHANGE UP (ref 150–400)
POTASSIUM SERPL-MCNC: 4.3 MMOL/L — SIGNIFICANT CHANGE UP (ref 3.5–5.3)
POTASSIUM SERPL-SCNC: 4.3 MMOL/L — SIGNIFICANT CHANGE UP (ref 3.5–5.3)
PROT SERPL-MCNC: 6.7 G/DL — SIGNIFICANT CHANGE UP (ref 6–8.3)
RBC # BLD: 5.42 M/UL — HIGH (ref 3.8–5.2)
RBC # FLD: 13 % — SIGNIFICANT CHANGE UP (ref 10.3–14.5)
SODIUM SERPL-SCNC: 142 MMOL/L — SIGNIFICANT CHANGE UP (ref 135–145)
WBC # BLD: 9.69 K/UL — SIGNIFICANT CHANGE UP (ref 3.8–10.5)
WBC # FLD AUTO: 9.69 K/UL — SIGNIFICANT CHANGE UP (ref 3.8–10.5)

## 2021-02-14 PROCEDURE — 73110 X-RAY EXAM OF WRIST: CPT

## 2021-02-14 PROCEDURE — 85025 COMPLETE CBC W/AUTO DIFF WBC: CPT

## 2021-02-14 PROCEDURE — 73120 X-RAY EXAM OF HAND: CPT

## 2021-02-14 PROCEDURE — 85652 RBC SED RATE AUTOMATED: CPT

## 2021-02-14 PROCEDURE — 99284 EMERGENCY DEPT VISIT MOD MDM: CPT

## 2021-02-14 PROCEDURE — 73110 X-RAY EXAM OF WRIST: CPT | Mod: 26,RT

## 2021-02-14 PROCEDURE — 86140 C-REACTIVE PROTEIN: CPT

## 2021-02-14 PROCEDURE — 80053 COMPREHEN METABOLIC PANEL: CPT

## 2021-02-14 PROCEDURE — 73120 X-RAY EXAM OF HAND: CPT | Mod: 26,RT

## 2021-02-14 RX ORDER — ACETAMINOPHEN 500 MG
650 TABLET ORAL ONCE
Refills: 0 | Status: COMPLETED | OUTPATIENT
Start: 2021-02-14 | End: 2021-02-14

## 2021-02-14 RX ORDER — IBUPROFEN 200 MG
600 TABLET ORAL ONCE
Refills: 0 | Status: COMPLETED | OUTPATIENT
Start: 2021-02-14 | End: 2021-02-14

## 2021-02-14 RX ADMIN — Medication 650 MILLIGRAM(S): at 17:52

## 2021-02-14 RX ADMIN — Medication 600 MILLIGRAM(S): at 17:52

## 2021-02-14 NOTE — ED PROVIDER NOTE - NS ED ROS FT
Gen: Denies fever, chills, generalized weakness  CV: Denies chest pain, palpitations  HEENT: Denies neck pain, headache, vision changes  Skin: Denies rash, erythema, color changes  Resp: Denies SOB, cough  GI: Denies constipation, nausea, vomiting, diarrhea  Msk: + right hand pain  : Denies dysuria, increased frequency  Neuro: Denies LOC, focal weakness, numbness, tingling  Psych: Denies hx of psych, SI, HI

## 2021-02-14 NOTE — ED PROVIDER NOTE - PROGRESS NOTE DETAILS
spoke with dr Franklyn Escobar plastic ,will see pt tomorrow on office ,start Augmentin and a splint

## 2021-02-14 NOTE — ED PROVIDER NOTE - PMH
GERD (gastroesophageal reflux disease)    Hypertension    Hypothyroidism    Migraine    Paralysis of diaphragm  Right  ; w/u Dr Tucker  [phone]

## 2021-02-14 NOTE — ED PROVIDER NOTE - NSFOLLOWUPCLINICS_GEN_ALL_ED_FT
[facility] - Primary Care  Primary Care  [address] [place] [address]  Phone: [phone]  Fax:   Follow Up Time:

## 2021-02-14 NOTE — ED PROVIDER NOTE - CLINICAL SUMMARY MEDICAL DECISION MAKING FREE TEXT BOX
Joseph Frankel PGY2: 49 yo transgender M with right third digit and hand pain. VSS. Patient looks well and is non toxic appearing. PE as above. Concern for flexor tenosynovitis. Also consider fracture however less likely. Will get xray, labs, speak with hand surgeon. Will reassess. : 49 yo transgender M  gets testosterone ingestions monthly  with right third digit and hand pain. VSS. Patient looks well and is non toxic appearing. PE as above. Concern for flexor tenosynovitis. Also consider fracture however less likely. Will get xray, labs, speak with hand surgeon. Will reassess. ZR

## 2021-02-14 NOTE — ED PROVIDER NOTE - CARE PROVIDER_API CALL
Franklyn Escobar  PLASTIC SURGERY  [address], [address]  [place] [address]  Phone: [phone]  Fax: [phone]  Follow Up Time: 4-6 Days

## 2021-02-14 NOTE — ED PROVIDER NOTE - PATIENT PORTAL LINK FT
You can access the FollowMyHealth Patient Portal offered by [facility] by registering at the following website: [email or web address]. By joining [employer]’s FollowMyHealth portal, you will also be able to view your health information using other applications (apps) compatible with our system.

## 2021-02-14 NOTE — ED PROVIDER NOTE - PHYSICAL EXAMINATION
Gen: Alert and oriented. Lying comfortably in bed. Answering questions appropriately  HEENT: extra occular movements intact, no nasal discharge, mucous membranes moist  CV: Regular rate and rhythm, +S1/S2, no murmurs/rubs/gallops,   Resp: Clear to ausculation bilaterally, no wheezes/rhonchi/rales  GI: Abdomen soft non-distended, non tender to palpation, no masses  MSK: Mild swelling of volar aspect of right wrist. ROM in wrist intact. Tenderness to palpation of palmar aspect of tight third digit, mild swelling of R third digit, Can passively move it but with some pain.   Neuro: A&Ox4, following commands, moving all four extremities spontaneously. Mild numbness at tip of third digit. Otherwise strength intact at right hand and digit  Psych: appropriate mood

## 2021-02-14 NOTE — ED PROVIDER NOTE - NSFOLLOWUPINSTRUCTIONS_ED_ALL_ED_FT
Rest and stay well hydrated.     Follow-up with Dr. Escobar (Hand Surgery Specialist) - call tomorrow to schedule appointment in the next week.     You can use 650mg Tylenol every 6 hours for pain - as needed.  This is an over-the-counter medications - please respect the warnings on the label. This medication come with certain risks and side effects that you need to discuss with your doctor, especially if you are taking it for a prolonged period.    Return to the ED for any worsening pain, fever, swelling, difficulty moving your hand or any new concerning symptoms. Rest and stay well hydrated.     Follow-up with Dr. Escobar (Hand Surgery Specialist) - call tomorrow after 9 AM to schedule an appointment for tomorrow.    You can use 650mg Tylenol every 6 hours for pain - as needed.  This is an over-the-counter medications - please respect the warnings on the label. This medication come with certain risks and side effects that you need to discuss with your doctor, especially if you are taking it for a prolonged period.    Return to the ED for any worsening pain, fever, swelling, difficulty moving your hand or any new concerning symptoms.

## 2021-02-14 NOTE — ED ADULT NURSE NOTE - OBJECTIVE STATEMENT
Pt reporting pain to R hand, worsening over the past few weeks, AXOX3, pt states pain is worse with movement, affecting all 5 fingers with pain to 3rd finger being the most severe. Pt reports pain radiates up arm to elbow, Pt denies trauma to hand, no redness, excess warmth or swelling noted to affected hand, no joint swelling noted, appears similar to nonsymptomatic hand. Sensation intact distal to reported pain, Pt reports working a job that requires frequent use of hands, No fevers chils, breathing unlabored, symmetrical, no shortness of breath, no chest pain, no n/v/d.

## 2021-02-14 NOTE — ED PROVIDER NOTE - OBJECTIVE STATEMENT
51 yo transgender M complaining of R hand pain x 5 days. 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 cannot remember a specific incidence that caused the pain. Pain is in right hand, worst at third digit. Worse with movement. Also with some numbness at the digit and swelling at volar aspect of wrist. Denies weakness, fever, n/v, chills. 49 yo transgender M  history of hypertension abd arthritis ,complaining of R hand pain x 5 days. 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 cannot remember a specific incidence that caused the pain. Pain is in right hand, worst at third digit. Worse with movement. Also with some numbness at the digit and swelling at volar aspect of wrist. Denies weakness, fever, n/v, chills.

## 2021-02-14 NOTE — ED PROVIDER NOTE - PSH
Patient took for two days.  States that it did help for pain. Patient states that he was tired all the time. Also, c/o  upset stomach, nausea, and headache.    Patient has discontinued and symptoms have subsided.  Gabapentin did not work.  Lyrica caused nighttimes.     Patient informed that medication will most likely not be prescribed for him.  Patient verbalized understanding.  Will discuss with Dr. Blancas.      Appointment scheduled for 10/23/17 for L- ERON.        S/P laparoscopic cholecystectomy

## 2021-04-17 ENCOUNTER — APPOINTMENT (OUTPATIENT)
Dept: ULTRASOUND IMAGING | Facility: IMAGING CENTER | Age: 51
End: 2021-04-17
Payer: COMMERCIAL

## 2021-04-17 ENCOUNTER — OUTPATIENT (OUTPATIENT)
Dept: OUTPATIENT SERVICES | Facility: HOSPITAL | Age: 51
LOS: 1 days | End: 2021-04-17
Payer: COMMERCIAL

## 2021-04-17 DIAGNOSIS — Z00.8 ENCOUNTER FOR OTHER GENERAL EXAMINATION: ICD-10-CM

## 2021-04-17 DIAGNOSIS — Z90.49 ACQUIRED ABSENCE OF OTHER SPECIFIED PARTS OF DIGESTIVE TRACT: Chronic | ICD-10-CM

## 2021-04-17 DIAGNOSIS — R10.2 PELVIC AND PERINEAL PAIN: ICD-10-CM

## 2021-04-17 PROCEDURE — 76856 US EXAM PELVIC COMPLETE: CPT

## 2021-04-17 PROCEDURE — 76856 US EXAM PELVIC COMPLETE: CPT | Mod: 26

## 2021-05-05 ENCOUNTER — FORM ENCOUNTER (OUTPATIENT)
Age: 51
End: 2021-05-05

## 2021-05-27 ENCOUNTER — APPOINTMENT (OUTPATIENT)
Dept: TRANSGENDER CARE | Facility: CLINIC | Age: 51
End: 2021-05-27

## 2021-05-27 ENCOUNTER — NON-APPOINTMENT (OUTPATIENT)
Age: 51
End: 2021-05-27

## 2021-06-03 ENCOUNTER — TRANSCRIPTION ENCOUNTER (OUTPATIENT)
Age: 51
End: 2021-06-03

## 2021-06-03 ENCOUNTER — APPOINTMENT (OUTPATIENT)
Dept: TRANSGENDER CARE | Facility: CLINIC | Age: 51
End: 2021-06-03

## 2021-07-07 ENCOUNTER — FORM ENCOUNTER (OUTPATIENT)
Age: 51
End: 2021-07-07

## 2021-07-08 ENCOUNTER — APPOINTMENT (OUTPATIENT)
Dept: OBGYN | Facility: CLINIC | Age: 51
End: 2021-07-08
Payer: COMMERCIAL

## 2021-07-08 PROCEDURE — 99072 ADDL SUPL MATRL&STAF TM PHE: CPT

## 2021-07-08 PROCEDURE — 99204 OFFICE O/P NEW MOD 45 MIN: CPT

## 2021-07-15 ENCOUNTER — APPOINTMENT (OUTPATIENT)
Dept: TRANSGENDER CARE | Facility: CLINIC | Age: 51
End: 2021-07-15
Payer: COMMERCIAL

## 2021-07-15 VITALS
BODY MASS INDEX: 28.32 KG/M2 | TEMPERATURE: 97.9 F | OXYGEN SATURATION: 98 % | WEIGHT: 170 LBS | RESPIRATION RATE: 18 BRPM | HEIGHT: 65 IN | HEART RATE: 67 BPM | SYSTOLIC BLOOD PRESSURE: 132 MMHG | DIASTOLIC BLOOD PRESSURE: 80 MMHG

## 2021-07-15 DIAGNOSIS — Z86.69 PERSONAL HISTORY OF OTHER DISEASES OF THE NERVOUS SYSTEM AND SENSE ORGANS: ICD-10-CM

## 2021-07-15 DIAGNOSIS — Z78.9 OTHER SPECIFIED HEALTH STATUS: ICD-10-CM

## 2021-07-15 DIAGNOSIS — L70.9 ACNE, UNSPECIFIED: ICD-10-CM

## 2021-07-15 PROCEDURE — 99205 OFFICE O/P NEW HI 60 MIN: CPT | Mod: 25

## 2021-07-15 PROCEDURE — 36415 COLL VENOUS BLD VENIPUNCTURE: CPT

## 2021-07-15 PROCEDURE — 99072 ADDL SUPL MATRL&STAF TM PHE: CPT

## 2021-07-15 RX ORDER — LEVOTHYROXINE SODIUM 25 UG/1
25 TABLET ORAL DAILY
Refills: 0 | Status: ACTIVE | COMMUNITY
Start: 2021-07-15

## 2021-07-15 NOTE — PHYSICAL EXAM
[No Acute Distress] : no acute distress [Well Nourished] : well nourished [Well Developed] : well developed [Well-Appearing] : well-appearing [Normal Sclera/Conjunctiva] : normal sclera/conjunctiva [PERRL] : pupils equal round and reactive to light [EOMI] : extraocular movements intact [Normal Outer Ear/Nose] : the outer ears and nose were normal in appearance [Normal Oropharynx] : the oropharynx was normal [No JVD] : no jugular venous distention [No Lymphadenopathy] : no lymphadenopathy [Supple] : supple [Thyroid Normal, No Nodules] : the thyroid was normal and there were no nodules present [No Respiratory Distress] : no respiratory distress  [No Accessory Muscle Use] : no accessory muscle use [Clear to Auscultation] : lungs were clear to auscultation bilaterally [Normal Rate] : normal rate  [Regular Rhythm] : with a regular rhythm [No Murmur] : no murmur heard [No Carotid Bruits] : no carotid bruits [No Abdominal Bruit] : a ~M bruit was not heard ~T in the abdomen [No Edema] : there was no peripheral edema [No Palpable Aorta] : no palpable aorta [No Extremity Clubbing/Cyanosis] : no extremity clubbing/cyanosis [Soft] : abdomen soft [Non Tender] : non-tender [Non-distended] : non-distended [No HSM] : no HSM [Normal Bowel Sounds] : normal bowel sounds [Normal Posterior Cervical Nodes] : no posterior cervical lymphadenopathy [Normal Anterior Cervical Nodes] : no anterior cervical lymphadenopathy [No Rash] : no rash [Alert and Oriented x3] : oriented to person, place, and time [Acne] : acne [Coordination Grossly Intact] : coordination grossly intact [Normal Gait] : normal gait [Speech Grossly Normal] : speech grossly normal [Normal Affect] : the affect was normal [Normal Mood] : the mood was normal [Normal Insight/Judgement] : insight and judgment were intact [de-identified] : to cheek area

## 2021-07-15 NOTE — PLAN
[FreeTextEntry1] : #gender: \par Patient to continue on current hormone therapy regimen as prescribed by Dr. Telles.\par Labs sent to evaluate underlying endocrine issues and assess current regimen.. \par Provided patient with contact information for Dr. Vela for consult as needed.\par To follow up with Dr. Olivier as desired for hysterotomy eval  .\par Will provide with gender marker letter today.\par \par #HLD:\par Check lipid panel\par \par #hypothyroid:\par Check TFTs today\par \par \par f/u in 6 months or sooner PRN\par \par

## 2021-07-15 NOTE — HEALTH RISK ASSESSMENT
[Very Good] : ~his/her~  mood as very good [No] : No [Patient reported mammogram was normal] : Patient reported mammogram was normal [Patient reported PAP Smear was normal] : Patient reported PAP Smear was normal [HIV test declined] : HIV test declined [Hepatitis C test declined] : Hepatitis C test declined [None] : None [With Significant Other] : lives with significant other [Employed] : employed [] :  [Sexually Active] : sexually active [Feels Safe at Home] : Feels safe at home [Fully functional (bathing, dressing, toileting, transferring, walking, feeding)] : Fully functional (bathing, dressing, toileting, transferring, walking, feeding) [Fully functional (using the telephone, shopping, preparing meals, housekeeping, doing laundry, using] : Fully functional and needs no help or supervision to perform IADLs (using the telephone, shopping, preparing meals, housekeeping, doing laundry, using transportation, managing medications and managing finances) [Smoke Detector] : smoke detector [Carbon Monoxide Detector] : carbon monoxide detector [Seat Belt] :  uses seat belt [] : No [Change in mental status noted] : No change in mental status noted [Language] : denies difficulty with language [Behavior] : denies difficulty with behavior [Learning/Retaining New Information] : denies difficulty learning/retaining new information [Reasoning] : denies difficulty with reasoning [Spatial Ability and Orientation] : denies difficulty with spatial ability and orientation [Guns at Home] : no guns at home [Sunscreen] : does not use sunscreen [MammogramDate] : 2019 [PapSmearDate] : 2019 [FreeTextEntry2] : [facility]

## 2021-07-15 NOTE — HISTORY OF PRESENT ILLNESS
[FreeTextEntry1] : Looking totransfer primary care, get information on transcare and continue HRT. [de-identified] : DREAD ERVIN is a 50 year old, transmale seen on 07/15/2021 for intial transgender care program intake visit.\par \par Preferred Pronouns: he/him\par Sexual orientation: straight\par Gender identity: male\par \par Transition history (Social, Endo, Surgical):Patient states he has always known he was male and has always identified as male.  States parents had a hard time dealing with that.  Mother passed away from COVID 3 months.  Raised in [place] and was always close with mom.  Mom was accepting and they had a good relationship.  Has a sister who is supportive in [place].  Currently  to a cis female for 8 years who is also from [place].  Does not have biological children but wife has a son who is 35.  He is also supportive.  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, etc..)\par \par Existing provider team:  PMD: Juan Pablo Fernandez     Endo: Nohemi Telles [phone]    GYN: Dr.Michael Olivier   Psychiatry: Rizwana Dean \par \par Mental Health: Has been seeing a psychologist - stopped approx 1 year ago as he did not like virtual sessions and felt ok.\par History of mental health admission: denies\par History of Suicidal/homicidal ideation & Self harm: denies\par \par Sexual history:  to a cisfemale for 8 years, monogamous relationship; does not have penetrative (receptive) intercourse.\par \par Last HIV test: denies\par Last STI tests and sites: denies\par Pap/Self-exam/Mammography/Colonoscopy: mammogram 2 years ago (prior to surgery), PAP completed 1 year ago.\par \par Binding/Tucking: Packs on occasion.\par \par Endo History: Has been in care with Dr. Telles for 2 years and is currently on Testosterone 200mg/ML (0.375 ml weekly).  Self injects on Friday.\par \par GYN history: has has a double mastectomy (1/19) and has been evaluated for a hysterectomy.\par \par Nutrition: Patient states he loves sweets but tried to eat healthy overall.\par \par Tattoos:  1 done professionally\par \par Goals of Trans care:\par 1) Legal gender marker change\par 2) Bottom surgery\par 3) Transferring endocrine care\par \par \par General health: \par No current health concerns. Feels well. No recent illness. \par

## 2021-07-15 NOTE — END OF VISIT
[FreeTextEntry3] : Patient seen by ANGE Moncada and DO Jo Ann. Case discussed at length and care plan developed collaboratively. I have reviewed her documentation above, and I agree with it (edited where necessary).  [Time Spent: ___ minutes] : I have spent [unfilled] minutes of time on the encounter.

## 2021-07-20 LAB
ALBUMIN SERPL ELPH-MCNC: 4.7 G/DL
ALP BLD-CCNC: 89 U/L
ALT SERPL-CCNC: 25 U/L
ANION GAP SERPL CALC-SCNC: 10 MMOL/L
AST SERPL-CCNC: 25 U/L
BILIRUB SERPL-MCNC: 0.5 MG/DL
BUN SERPL-MCNC: 15 MG/DL
CALCIUM SERPL-MCNC: 9.4 MG/DL
CHLORIDE SERPL-SCNC: 106 MMOL/L
CO2 SERPL-SCNC: 25 MMOL/L
CREAT SERPL-MCNC: 0.83 MG/DL
GLUCOSE SERPL-MCNC: 103 MG/DL
POTASSIUM SERPL-SCNC: 4.8 MMOL/L
PROT SERPL-MCNC: 7.3 G/DL
SODIUM SERPL-SCNC: 140 MMOL/L

## 2021-07-23 LAB
FSH SERPL-MCNC: 0.5 IU/L
LH SERPL-ACNC: 0.4 IU/L
TESTOST SERPL-MCNC: 278 NG/DL

## 2021-08-26 LAB
25(OH)D3 SERPL-MCNC: 22.9 NG/ML
APPEARANCE: CLEAR
BILIRUBIN URINE: NEGATIVE
BLOOD URINE: NEGATIVE
COLOR: YELLOW
GLUCOSE QUALITATIVE U: NEGATIVE
KETONES URINE: NEGATIVE
LEUKOCYTE ESTERASE URINE: NEGATIVE
NITRITE URINE: NEGATIVE
PH URINE: 6.5
PROTEIN URINE: NORMAL
SPECIFIC GRAVITY URINE: 1.03
UROBILINOGEN URINE: NORMAL

## 2021-09-17 NOTE — ED ADULT NURSE NOTE - NSFALLRSKASSESSTYPE_ED_ALL_ED
Eating Heart-Healthy Foods  Eating has a big impact on your heart health. In fact, eating healthier can improve several of your heart risks at once. For instance, it helps you manage weight, cholesterol, and blood pressure.  Here are ideas to help you ortiz foods and getting regular exercise. To help you track your progress, keep a diary to record what you eat and how often you exercise. Choose the right foods  Aim to make these foods staples of your diet.  If you have diabetes, you may have different recomme down on added fat, sugar and salt. Look on the internet for lower-fat, lower-sodium recipes without a lot of added sugars. Also try these tips:   · Remove fat from meat and skin from poultry before cooking. · Skim fat from the surface of soups and sauces. Initial (On Arrival)

## 2021-09-28 ENCOUNTER — OUTPATIENT (OUTPATIENT)
Dept: OUTPATIENT SERVICES | Facility: HOSPITAL | Age: 51
LOS: 1 days | End: 2021-09-28
Payer: COMMERCIAL

## 2021-09-28 VITALS
TEMPERATURE: 97 F | HEART RATE: 62 BPM | DIASTOLIC BLOOD PRESSURE: 84 MMHG | OXYGEN SATURATION: 97 % | HEIGHT: 64.5 IN | SYSTOLIC BLOOD PRESSURE: 140 MMHG | WEIGHT: 177.03 LBS

## 2021-09-28 DIAGNOSIS — Z90.49 ACQUIRED ABSENCE OF OTHER SPECIFIED PARTS OF DIGESTIVE TRACT: Chronic | ICD-10-CM

## 2021-09-28 DIAGNOSIS — F64.1 DUAL ROLE TRANSVESTISM: ICD-10-CM

## 2021-09-28 DIAGNOSIS — Z90.13 ACQUIRED ABSENCE OF BILATERAL BREASTS AND NIPPLES: Chronic | ICD-10-CM

## 2021-09-28 DIAGNOSIS — I10 ESSENTIAL (PRIMARY) HYPERTENSION: ICD-10-CM

## 2021-09-28 DIAGNOSIS — Z01.812 ENCOUNTER FOR PREPROCEDURAL LABORATORY EXAMINATION: ICD-10-CM

## 2021-09-28 DIAGNOSIS — E03.9 HYPOTHYROIDISM, UNSPECIFIED: ICD-10-CM

## 2021-09-28 DIAGNOSIS — Z84.89 FAMILY HISTORY OF OTHER SPECIFIED CONDITIONS: Chronic | ICD-10-CM

## 2021-09-28 DIAGNOSIS — F64.0 TRANSSEXUALISM: ICD-10-CM

## 2021-09-28 LAB
A1C WITH ESTIMATED AVERAGE GLUCOSE RESULT: 5.4 % — SIGNIFICANT CHANGE UP (ref 4–5.6)
ANION GAP SERPL CALC-SCNC: 12 MMOL/L — SIGNIFICANT CHANGE UP (ref 7–14)
BLD GP AB SCN SERPL QL: NEGATIVE — SIGNIFICANT CHANGE UP
BUN SERPL-MCNC: 13 MG/DL — SIGNIFICANT CHANGE UP (ref 7–23)
CALCIUM SERPL-MCNC: 9.3 MG/DL — SIGNIFICANT CHANGE UP (ref 8.4–10.5)
CHLORIDE SERPL-SCNC: 101 MMOL/L — SIGNIFICANT CHANGE UP (ref 98–107)
CO2 SERPL-SCNC: 28 MMOL/L — SIGNIFICANT CHANGE UP (ref 22–31)
CREAT SERPL-MCNC: 0.83 MG/DL — SIGNIFICANT CHANGE UP (ref 0.5–1.3)
ESTIMATED AVERAGE GLUCOSE: 108 — SIGNIFICANT CHANGE UP
GLUCOSE SERPL-MCNC: 80 MG/DL — SIGNIFICANT CHANGE UP (ref 70–99)
HCG UR QL: NEGATIVE — SIGNIFICANT CHANGE UP
HCT VFR BLD CALC: 52.6 % — HIGH (ref 39–50)
HGB BLD-MCNC: 17.8 G/DL — HIGH (ref 13–17)
MCHC RBC-ENTMCNC: 30.1 PG — SIGNIFICANT CHANGE UP (ref 27–34)
MCHC RBC-ENTMCNC: 33.8 GM/DL — SIGNIFICANT CHANGE UP (ref 32–36)
MCV RBC AUTO: 89 FL — SIGNIFICANT CHANGE UP (ref 80–100)
NRBC # BLD: 0 /100 WBCS — SIGNIFICANT CHANGE UP
NRBC # FLD: 0 K/UL — SIGNIFICANT CHANGE UP
PLATELET # BLD AUTO: 247 K/UL — SIGNIFICANT CHANGE UP (ref 150–400)
POTASSIUM SERPL-MCNC: 4.3 MMOL/L — SIGNIFICANT CHANGE UP (ref 3.5–5.3)
POTASSIUM SERPL-SCNC: 4.3 MMOL/L — SIGNIFICANT CHANGE UP (ref 3.5–5.3)
RBC # BLD: 5.91 M/UL — HIGH (ref 4.2–5.8)
RBC # FLD: 12.6 % — SIGNIFICANT CHANGE UP (ref 10.3–14.5)
RH IG SCN BLD-IMP: POSITIVE — SIGNIFICANT CHANGE UP
SODIUM SERPL-SCNC: 141 MMOL/L — SIGNIFICANT CHANGE UP (ref 135–145)
WBC # BLD: 8.84 K/UL — SIGNIFICANT CHANGE UP (ref 3.8–10.5)
WBC # FLD AUTO: 8.84 K/UL — SIGNIFICANT CHANGE UP (ref 3.8–10.5)

## 2021-09-28 PROCEDURE — 93010 ELECTROCARDIOGRAM REPORT: CPT

## 2021-09-28 RX ORDER — PROPRANOLOL HCL 160 MG
80 CAPSULE, EXTENDED RELEASE 24HR ORAL
Qty: 0 | Refills: 0 | DISCHARGE

## 2021-09-28 RX ORDER — LEVOTHYROXINE SODIUM 125 MCG
1 TABLET ORAL
Qty: 0 | Refills: 0 | DISCHARGE

## 2021-09-28 RX ORDER — OMEPRAZOLE 10 MG/1
1 CAPSULE, DELAYED RELEASE ORAL
Qty: 0 | Refills: 0 | DISCHARGE

## 2021-09-28 RX ORDER — SODIUM CHLORIDE 9 MG/ML
1000 INJECTION, SOLUTION INTRAVENOUS
Refills: 0 | Status: DISCONTINUED | OUTPATIENT
Start: 2021-10-11 | End: 2021-10-25

## 2021-09-28 NOTE — H&P PST ADULT - NSANTHOSAYNRD_GEN_A_CORE
home sleep study 5 months ago requested by Dr. Fernandez/No. REGINE screening performed.  STOP BANG Legend: 0-2 = LOW Risk; 3-4 = INTERMEDIATE Risk; 5-8 = HIGH Risk home sleep study 5 months ago requested by prior PCP Dr. Juan Pablo Fernandez/No. REGINE screening performed.  STOP BANG Legend: 0-2 = LOW Risk; 3-4 = INTERMEDIATE Risk; 5-8 = HIGH Risk

## 2021-09-28 NOTE — H&P PST ADULT - ACTIVITY
ADLs, house chores, able to walk 1 block without SOB ADLs, house chores, able to walk 1 block and climb a flight of stairs without SOB

## 2021-09-28 NOTE — H&P PST ADULT - NSICDXPASTMEDICALHX_GEN_ALL_CORE_FT
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

## 2021-09-28 NOTE — H&P PST ADULT - HISTORY OF PRESENT ILLNESS
51 yo female presents to [facility] for preop evaluation for total laparoscopic hysterectomy, bilateral salpingo oophorectomy. 49 yo female, identifies as male presents to [facility] for preop evaluation for total laparoscopic hysterectomy, bilateral salpingo oophorectomy.  Patient diagnosed with gender identity disorder in adolescence and adulthood.

## 2021-09-28 NOTE — H&P PST ADULT - PROBLEM SELECTOR PLAN 1
Patient scheduled for total laparoscopic hysterectomy, bilateral salpingo oophorectomy on 10/11/2021  Written & verbal preop instructions, gi prophylaxis patient to take own & surgical soap given  Pt verbalized good understanding.  Teach back done on surgical soap instructions.   REGINE precaution recommended  Discussed case with Dr. Larsen, pt with paralysis of left diaphragm, per Dr. Larsen does not require preop medical evaluation Patient scheduled for total laparoscopic hysterectomy, bilateral salpingo oophorectomy on 10/11/2021  Written & verbal preop instructions, gi prophylaxis patient to take own & surgical soap given  Pt verbalized good understanding.  Teach back done on surgical soap instructions.   REGINE precaution recommended  Discussed case with Dr. Larsen, pt with paralysis of right diaphragm, per Dr. Larsen does not require preop medical evaluation

## 2021-09-28 NOTE — H&P PST ADULT - NEGATIVE OPHTHALMOLOGIC SYMPTOMS
no diplopia/no photophobia/no blurred vision L/no blurred vision R/no pain L/no pain R/no irritation L

## 2021-09-28 NOTE — H&P PST ADULT - NEGATIVE NEUROLOGICAL SYMPTOMS
no transient paralysis/no weakness/no paresthesias/no generalized seizures/no syncope/no tremors/no vertigo/no difficulty walking/no loss of consciousness

## 2021-09-28 NOTE — H&P PST ADULT - NEGATIVE ENMT SYMPTOMS
no hearing difficulty/no ear pain/no tinnitus/no vertigo/no sinus symptoms/no nasal discharge/no nose bleeds/no gum bleeding/no throat pain/no dysphagia

## 2021-09-28 NOTE — H&P PST ADULT - LAB RESULTS AND INTERPRETATION
Called and left message#1 for patient to return call to clinic. This letter should be available to patient willaw Lukas otherwise he can call back with how he would like to receive this letter.   CBC, BMP, HgbA1C, T&S, UCG pending

## 2021-09-29 NOTE — ED PROVIDER NOTE - WR ORDER ID 2
Please call and confirm that it is the LANTUS that they are using twice per day and not the NOVOLOG dosage.    [id number]

## 2021-10-03 ENCOUNTER — FORM ENCOUNTER (OUTPATIENT)
Age: 51
End: 2021-10-03

## 2021-10-03 PROBLEM — J98.6 DISORDERS OF DIAPHRAGM: Chronic | Status: ACTIVE | Noted: 2019-12-27

## 2021-10-03 PROBLEM — F64.0 TRANSSEXUALISM: Chronic | Status: ACTIVE | Noted: 2021-09-28

## 2021-10-04 LAB
BASOPHILS # BLD AUTO: 0.07 K/UL
BASOPHILS NFR BLD AUTO: 0.7 %
CHOLEST SERPL-MCNC: 185 MG/DL
EOSINOPHIL # BLD AUTO: 0.21 K/UL
EOSINOPHIL NFR BLD AUTO: 2 %
HCT VFR BLD CALC: 55.8 %
HDLC SERPL-MCNC: 33 MG/DL
HGB BLD-MCNC: 18.1 G/DL
IMM GRANULOCYTES NFR BLD AUTO: 0.6 %
LDLC SERPL CALC-MCNC: 82 MG/DL
LYMPHOCYTES # BLD AUTO: 2.75 K/UL
LYMPHOCYTES NFR BLD AUTO: 26 %
MAN DIFF?: NORMAL
MCHC RBC-ENTMCNC: 30.4 PG
MCHC RBC-ENTMCNC: 32.4 GM/DL
MCV RBC AUTO: 93.8 FL
MONOCYTES # BLD AUTO: 0.87 K/UL
MONOCYTES NFR BLD AUTO: 8.2 %
NEUTROPHILS # BLD AUTO: 6.6 K/UL
NEUTROPHILS NFR BLD AUTO: 62.5 %
NONHDLC SERPL-MCNC: 152 MG/DL
PLATELET # BLD AUTO: 292 K/UL
RBC # BLD: 5.95 M/UL
RBC # FLD: 13.2 %
TRIGL SERPL-MCNC: 351 MG/DL
TSH SERPL-ACNC: 2.02 UIU/ML
WBC # FLD AUTO: 10.56 K/UL

## 2021-10-05 ENCOUNTER — FORM ENCOUNTER (OUTPATIENT)
Age: 51
End: 2021-10-05

## 2021-10-07 DIAGNOSIS — Z01.818 ENCOUNTER FOR OTHER PREPROCEDURAL EXAMINATION: ICD-10-CM

## 2021-10-08 ENCOUNTER — APPOINTMENT (OUTPATIENT)
Dept: DISASTER EMERGENCY | Facility: CLINIC | Age: 51
End: 2021-10-08

## 2021-10-08 LAB — SARS-COV-2 N GENE NPH QL NAA+PROBE: NOT DETECTED

## 2021-10-08 NOTE — ASU PATIENT PROFILE, ADULT - MEDICATIONS TO TAKE
----- Message from Alhaji Yang M.D. sent at 9/14/2021  6:32 PM PDT -----  The holter test looks good, please let her know     Thank you   per past

## 2021-10-10 ENCOUNTER — TRANSCRIPTION ENCOUNTER (OUTPATIENT)
Age: 51
End: 2021-10-10

## 2021-10-11 ENCOUNTER — APPOINTMENT (OUTPATIENT)
Dept: OBGYN | Facility: HOSPITAL | Age: 51
End: 2021-10-11

## 2021-10-11 ENCOUNTER — RESULT REVIEW (OUTPATIENT)
Age: 51
End: 2021-10-11

## 2021-10-11 ENCOUNTER — OUTPATIENT (OUTPATIENT)
Dept: OUTPATIENT SERVICES | Facility: HOSPITAL | Age: 51
LOS: 1 days | Discharge: HOME | End: 2021-10-11
Payer: COMMERCIAL

## 2021-10-11 VITALS
SYSTOLIC BLOOD PRESSURE: 147 MMHG | WEIGHT: 177.03 LBS | HEIGHT: 64.5 IN | OXYGEN SATURATION: 99 % | DIASTOLIC BLOOD PRESSURE: 97 MMHG | HEART RATE: 56 BPM | RESPIRATION RATE: 18 BRPM | TEMPERATURE: 99 F

## 2021-10-11 VITALS
HEART RATE: 80 BPM | RESPIRATION RATE: 15 BRPM | TEMPERATURE: 98 F | DIASTOLIC BLOOD PRESSURE: 69 MMHG | OXYGEN SATURATION: 99 % | SYSTOLIC BLOOD PRESSURE: 112 MMHG

## 2021-10-11 DIAGNOSIS — Z90.13 ACQUIRED ABSENCE OF BILATERAL BREASTS AND NIPPLES: Chronic | ICD-10-CM

## 2021-10-11 DIAGNOSIS — Z90.49 ACQUIRED ABSENCE OF OTHER SPECIFIED PARTS OF DIGESTIVE TRACT: Chronic | ICD-10-CM

## 2021-10-11 DIAGNOSIS — F64.1 DUAL ROLE TRANSVESTISM: ICD-10-CM

## 2021-10-11 LAB
GLUCOSE BLDC GLUCOMTR-MCNC: 95 MG/DL — SIGNIFICANT CHANGE UP (ref 70–99)
HCG UR QL: NEGATIVE — SIGNIFICANT CHANGE UP

## 2021-10-11 PROCEDURE — 58571 TLH W/T/O 250 G OR LESS: CPT

## 2021-10-11 PROCEDURE — 88307 TISSUE EXAM BY PATHOLOGIST: CPT | Mod: 26

## 2021-10-11 RX ORDER — PROPRANOLOL HCL 160 MG
0 CAPSULE, EXTENDED RELEASE 24HR ORAL
Qty: 0 | Refills: 0 | DISCHARGE

## 2021-10-11 RX ORDER — SODIUM CHLORIDE 9 MG/ML
1000 INJECTION, SOLUTION INTRAVENOUS
Refills: 0 | Status: DISCONTINUED | OUTPATIENT
Start: 2021-10-11 | End: 2021-10-25

## 2021-10-11 RX ORDER — CHLORHEXIDINE GLUCONATE 213 G/1000ML
1 SOLUTION TOPICAL ONCE
Refills: 0 | Status: COMPLETED | OUTPATIENT
Start: 2021-10-11 | End: 2021-10-11

## 2021-10-11 RX ORDER — LEVOTHYROXINE SODIUM 125 MCG
1 TABLET ORAL
Qty: 0 | Refills: 0 | DISCHARGE

## 2021-10-11 RX ORDER — OXYCODONE HYDROCHLORIDE 5 MG/1
1 TABLET ORAL
Qty: 5 | Refills: 0
Start: 2021-10-11

## 2021-10-11 RX ORDER — SENNA PLUS 8.6 MG/1
1 TABLET ORAL
Qty: 20 | Refills: 0
Start: 2021-10-11 | End: 2021-10-20

## 2021-10-11 RX ORDER — ACETAMINOPHEN 500 MG
2 TABLET ORAL
Qty: 40 | Refills: 0
Start: 2021-10-11 | End: 2021-10-15

## 2021-10-11 RX ORDER — IBUPROFEN 200 MG
1 TABLET ORAL
Qty: 20 | Refills: 0
Start: 2021-10-11 | End: 2021-10-15

## 2021-10-11 RX ORDER — PANTOPRAZOLE SODIUM 20 MG/1
1 TABLET, DELAYED RELEASE ORAL
Qty: 0 | Refills: 0 | DISCHARGE

## 2021-10-11 RX ADMIN — CHLORHEXIDINE GLUCONATE 1 APPLICATION(S): 213 SOLUTION TOPICAL at 09:45

## 2021-10-11 NOTE — ASU DISCHARGE PLAN (ADULT/PEDIATRIC) - NURSING INSTRUCTIONS
You received IV Tylenol for pain management at _1pm __. Please DO NOT take any Tylenol (Acetaminophen) containing products, such as Vicodin, Percocet, Excedrin, and cold medications for the next 6 hours (until __7_ PM). DO NOT TAKE MORE THAN 3000 MG OF TYLENOL in a 24 hour period.    You received IV Toradol for pain management at __1:30pm _. Please DO NOT take Motrin/Ibuprofen/Advil/Aleve/NSAIDs (Non-Steroidal Anti-Inflammatory Drugs) for the next 6 hours (until _7:30__ PM).

## 2021-10-11 NOTE — ASU DISCHARGE PLAN (ADULT/PEDIATRIC) - ASU DC SPECIAL INSTRUCTIONSFT
Return to your regular way of eating.  Resume normal activity as tolerated, but no heavy lifting or strenuous activity for 2 weeks.  No driving for next 2 weeks and/or while on narcotic pain medication.  Complete vaginal rest, no tampons, no douching, no tub bathing, no sexual activities for 2 weeks unless otherwise instructed by your doctor.  Call your doctor with any signs and symptoms of infection such as fever, chills, nausea or vomiting.  Call your doctor with redness or swelling at the incision site, fluid leakage or wound separation.  Call your doctor if you're unable to tolerate food or have difficulty urinating.  Call your doctor if you have pain that is not relieved by your prescribed medications.  Notify your doctor with any other concerns.  Follow up with Dr. Olivier in 2 weeks.

## 2021-10-11 NOTE — BRIEF OPERATIVE NOTE - NSICDXBRIEFPROCEDURE_GEN_ALL_CORE_FT
PROCEDURES:  Hysterectomy, total, laparoscopic, with bilateral salpingo-oophorectomy, for uterus greater than 250 grams 11-Oct-2021 15:04:41  Tena Patel

## 2021-10-11 NOTE — ASU DISCHARGE PLAN (ADULT/PEDIATRIC) - CARE PROVIDER_API CALL
Daniel Olivier)  Obstetrics and Gynecology  [address] [place] [address], [address]  [place] [address]  Phone: [phone]  Fax: [phone]  Follow Up Time:

## 2021-10-11 NOTE — ASU DISCHARGE PLAN (ADULT/PEDIATRIC) - CALL YOUR DOCTOR IF YOU HAVE ANY OF THE FOLLOWING:
Bleeding that does not stop/Pain not relieved by Medications/Fever greater than (need to indicate Fahrenheit or Celsius)/Wound/Surgical Site with redness, or foul smelling discharge or pus/Unable to urinate/Inability to tolerate liquids or foods

## 2021-10-11 NOTE — BRIEF OPERATIVE NOTE - OPERATION/FINDINGS
EUA  - small introitus, intact hymenal ring  - normal cervix and vagina  Laparoscopic  - normal small uterus, normal bilateral tubes and ovaries  - bilateral ureters vermiculating

## 2021-10-11 NOTE — ASU PREOP CHECKLIST - SELECT TESTS ORDERED
None BMP/CBC/Type and Cross/HCG/UCG/EKG/COVID-19 BMP/CBC/Type and Cross/HCG/UCG/EKG/POCT Blood Glucose/COVID-19

## 2021-10-14 LAB — SURGICAL PATHOLOGY STUDY: SIGNIFICANT CHANGE UP

## 2021-10-15 DIAGNOSIS — Z82.49 FAMILY HISTORY OF ISCHEMIC HEART DISEASE AND OTHER DISEASES OF THE CIRCULATORY SYSTEM: ICD-10-CM

## 2021-10-15 DIAGNOSIS — Z98.890 OTHER SPECIFIED POSTPROCEDURAL STATES: ICD-10-CM

## 2021-10-28 ENCOUNTER — APPOINTMENT (OUTPATIENT)
Dept: OBGYN | Facility: CLINIC | Age: 51
End: 2021-10-28
Payer: COMMERCIAL

## 2021-10-28 VITALS
BODY MASS INDEX: 28.32 KG/M2 | DIASTOLIC BLOOD PRESSURE: 80 MMHG | HEIGHT: 65 IN | WEIGHT: 170 LBS | SYSTOLIC BLOOD PRESSURE: 120 MMHG

## 2021-10-28 PROCEDURE — 99024 POSTOP FOLLOW-UP VISIT: CPT

## 2021-10-28 NOTE — HISTORY OF PRESENT ILLNESS
[Pain is well-controlled] : pain is well-controlled [Fever] : no fever [Chills] : no chills [Nausea] : no nausea [Vomiting] : no vomiting [Vaginal Bleeding] : vaginal bleeding [Pelvic Pressure] : no pelvic pressure [Dysuria] : no dysuria [Clean/Dry/Intact] : clean, dry and intact [Erythema] : not erythematous [Swelling] : not swollen [Dehiscence] : not dehisced [Healed] : healed [Pathology reviewed] : pathology reviewed [de-identified] : [facility]; BILATERAL SALPINGO- OOPHORECTOMY [de-identified] : post op visit TOTAL LAPAROSCOPIC HYSTERECTOMY WITH BILATERAL SALPINGO-OOPHORECTOMY for for gender dysmorphic disorder\par [facility] BSO pathology benign

## 2021-11-04 ENCOUNTER — NON-APPOINTMENT (OUTPATIENT)
Age: 51
End: 2021-11-04

## 2021-11-05 ENCOUNTER — NON-APPOINTMENT (OUTPATIENT)
Age: 51
End: 2021-11-05

## 2021-11-18 ENCOUNTER — APPOINTMENT (OUTPATIENT)
Dept: OBGYN | Facility: CLINIC | Age: 51
End: 2021-11-18
Payer: COMMERCIAL

## 2021-11-18 VITALS
WEIGHT: 170 LBS | BODY MASS INDEX: 28.32 KG/M2 | DIASTOLIC BLOOD PRESSURE: 78 MMHG | SYSTOLIC BLOOD PRESSURE: 110 MMHG | HEIGHT: 65 IN

## 2021-11-18 PROCEDURE — 99024 POSTOP FOLLOW-UP VISIT: CPT

## 2021-11-18 NOTE — HISTORY OF PRESENT ILLNESS
[Pain is well-controlled] : pain is well-controlled [Fever] : no fever [Chills] : no chills [Nausea] : no nausea [Vomiting] : no vomiting [Clean/Dry/Intact] : clean, dry and intact [Erythema] : not erythematous [None] : no vaginal bleeding [Normal] : normal [Pathology reviewed] : pathology reviewed [de-identified] : Haydee is status post [facility] BSO for her gender dysmorphic disorder doing well without complaints

## 2022-01-03 NOTE — ED ADULT NURSE NOTE - PAIN: ALLEVIATING FACTORS
ED Provider Note    Scribed for Monique Mares M.D. by Dara Gandhi. 1/3/2022  3:49 PM    Primary care provider: Suman Hope D.O.  Means of arrival: Wheel Chair  History obtained from: Patient  History limited by: None    CHIEF COMPLAINT  Chief Complaint   Patient presents with   • Chest Pain     Pt reports awakening this morning with mid-left 9/10 chest pain that does not radiate. Pt reports being diaphoretic. Pt has a cardiac history of stents and a medtronic pacemaker placed and a double bypass surgery.      PPE Note: I personally donned full PPE for all patient encounters during this visit, including wearing an N95 respirator mask, gloves, and eye protection. Scribe remained outside the patient's room and did not have any contact with the patient for the duration of patient encounter.      HPI  Antony Akbar SrTristin is a 63 y.o. male who presents to the Emergency Department with worsening centralized chest pain onset 6 months ago. He states he experiences on average 2 episodes of stabbing chest pain daily that last approximately 30 minutes. He additionally reports experiencing shortness of breath, nausea, bilateral leg swelling, and palpitations multiple times each day. He denies cough, sore throat, congestion, and vomiting. He reports taking his daily medications as prescribed. He contacted his primary care provider who ordered a stress test. He reports he was about to have the stress test performed at [facility] today when he had an episode of chest pain. They instructed him to be immediately evaluated at the Emergency Department. He has a history of 6 stents placements as well as a pacemaker. He also has a history of COPD and asthma. He denies a history of diabetes. The last time he saw his cardiologist was 6 months ago. He is currently experiencing chest pain which he rates a 3/10 in severity. He states he took a nitro with no alleviation. He denies cigarette, alcohol, or other drug use. He had  hip surgery 6 months ago. He denies any recent travel.    REVIEW OF SYSTEMS   HENT: No sore throat, No congestion  CARDIAC: Positive for chest pain and palpitations  PULMONARY: Positive for shortness of breath. No cough  GI: Positive for nausea. No vomiting.   Musculoskeletal: Positive for leg swelling.    See history of present illness. All other systems are negative. C.    PAST MEDICAL HISTORY   has a past medical history of Asthma, Back pain, BPH (benign prostatic hyperplasia), Chronic obstructive pulmonary disease (HCC), Hypertension, and Hypothyroid.    SURGICAL HISTORY   has a past surgical history that includes cardiac cath, right/left heart and open fix inter/subtroch fx,implnt (Left, 8/4/2021).    SOCIAL HISTORY  Social History     Tobacco Use   • Smoking status: Former Smoker   • Smokeless tobacco: Never Used   Vaping Use   • Vaping Use: Never used   Substance Use Topics   • Alcohol use: Not Currently   • Drug use: Not Currently      Social History     Substance and Sexual Activity   Drug Use Not Currently       FAMILY HISTORY  Patient denies any family cardiac history.    CURRENT MEDICATIONS  Home Medications     Reviewed by Kathie Fletcher (Pharmacy Tech) on 01/03/22 at 1742  Med List Status: Complete   Medication Last Dose Status   albuterol 108 (90 Base) MCG/ACT Aero Soln inhalation aerosol 1/2/2022 Active   atorvastatin (LIPITOR) 40 MG Tab 1/3/2022 Active   cloNIDine (CATAPRES) 0.1 MG Tab 1/3/2022 Active   clopidogrel (PLAVIX) 75 MG Tab 1/3/2022 Active   cyclobenzaprine (FLEXERIL) 10 mg Tab 1/3/2022 Active   EPINEPHrine (EPIPEN 2-VEL) 0.3 MG/0.3ML Solution Auto-injector solution for injection PRN Active   famotidine (PEPCID) 40 MG Tab 1/3/2022 Active   gabapentin (NEURONTIN) 100 MG Cap 1/3/2022 Active   isosorbide mononitrate SR (IMDUR) 60 MG TABLET SR 24 HR 1/3/2022 Active   levothyroxine (SYNTHROID) 50 MCG Tab 1/3/2022 Active   lisinopril (PRINIVIL) 40 MG tablet 1/3/2022 Active   metoprolol SR  "(TOPROL XL) 50 MG TABLET SR 24 HR 1/3/2022 Active   morphine ER (MS CONTIN) 15 MG Tab CR tablet 1/3/2022 Active   nitroglycerin (NITROSTAT) 0.4 MG SL Tab PRN Active   sertraline (ZOLOFT) 100 MG Tab 1/2/2022 Active   tamsulosin (FLOMAX) 0.4 MG capsule 1/2/2022 Active   vitamin D3 (CHOLECALCIFEROL) 1000 Unit (25 mcg) Tab 1/3/2022 Active                ALLERGIES  Allergies   Allergen Reactions   • Bee Venom Anaphylaxis       PHYSICAL EXAM  VITAL SIGNS: BP (!) 174/94   Pulse 99   Temp 36.7 °C (98 °F) (Temporal)   Resp (!) 41   Ht 1.702 m (5' 7\")   Wt 86.2 kg (190 lb)   SpO2 90%   BMI 29.76 kg/m²     Constitutional: Well developed, Well nourished, No acute distress, Non-toxic appearance.   HEENT: Normocephalic, Atraumatic,  external ears normal, pharynx pink,  Mucous  Membranes moist, No rhinorrhea or mucosal edema  Eyes: PERRL, EOMI, Conjunctiva normal, No discharge.   Neck: Normal range of motion, No tenderness, Supple, No stridor.   Lymphatic: No lymphadenopathy    Cardiovascular: Regular Rate and Rhythm, No murmurs,  rubs, or gallops. initially tachycardic in riage  Thorax & Lungs: Lungs clear to auscultation bilaterally, No respiratory distress, No wheezes, rhales or rhonchi, No chest wall tenderness.   Abdomen: Bowel sounds normal, Soft, non tender, non distended,  No pulsatile masses., no rebound guarding or peritoneal signs.   Skin: Warm, Dry, No erythema, No rash,   Back:  No CVA tenderness,  No spinal tenderness, bony crepitance, step offs, or instability.   Neurologic: Alert & oriented clear speech no focal deficits  Extremities: Equal, intact distal pulses, No cyanosis, clubbing or edema,  No tenderness.   Musculoskeletal: Good range of motion in all major joints. No tenderness to palpation or major deformities noted.     DIAGNOSTIC STUDIES / PROCEDURES    LABS  Results for orders placed or performed during the hospital encounter of 01/03/22   CBC with Differential   Result Value Ref Range    WBC 18.4 " (H) 4.8 - 10.8 K/uL    RBC 5.91 4.70 - 6.10 M/uL    Hemoglobin 15.3 14.0 - 18.0 g/dL    Hematocrit 47.6 42.0 - 52.0 %    MCV 80.5 (L) 81.4 - 97.8 fL    MCH 25.9 (L) 27.0 - 33.0 pg    MCHC 32.1 (L) 33.7 - 35.3 g/dL    RDW 50.5 (H) 35.9 - 50.0 fL    Platelet Count 218 164 - 446 K/uL    MPV 10.9 9.0 - 12.9 fL    Neutrophils-Polys 86.80 (H) 44.00 - 72.00 %    Lymphocytes 7.50 (L) 22.00 - 41.00 %    Monocytes 4.50 0.00 - 13.40 %    Eosinophils 0.20 0.00 - 6.90 %    Basophils 0.50 0.00 - 1.80 %    Immature Granulocytes 0.50 0.00 - 0.90 %    Nucleated RBC 0.00 /100 WBC    Neutrophils (Absolute) 16.02 (H) 1.82 - 7.42 K/uL    Lymphs (Absolute) 1.38 1.00 - 4.80 K/uL    Monos (Absolute) 0.83 0.00 - 0.85 K/uL    Eos (Absolute) 0.03 0.00 - 0.51 K/uL    Baso (Absolute) 0.09 0.00 - 0.12 K/uL    Immature Granulocytes (abs) 0.09 0.00 - 0.11 K/uL    NRBC (Absolute) 0.00 K/uL   Complete Metabolic Panel (CMP)   Result Value Ref Range    Sodium 143 135 - 145 mmol/L    Potassium 3.6 3.6 - 5.5 mmol/L    Chloride 105 96 - 112 mmol/L    Co2 21 20 - 33 mmol/L    Anion Gap 17.0 (H) 7.0 - 16.0    Glucose 76 65 - 99 mg/dL    Bun 10 8 - 22 mg/dL    Creatinine 0.84 0.50 - 1.40 mg/dL    Calcium 9.5 8.5 - 10.5 mg/dL    AST(SGOT) 14 12 - 45 U/L    ALT(SGPT) 9 2 - 50 U/L    Alkaline Phosphatase 140 (H) 30 - 99 U/L    Total Bilirubin 0.9 0.1 - 1.5 mg/dL    Albumin 5.1 (H) 3.2 - 4.9 g/dL    Total Protein 7.4 6.0 - 8.2 g/dL    Globulin 2.3 1.9 - 3.5 g/dL    A-G Ratio 2.2 g/dL   Troponin   Result Value Ref Range    Troponin T 20 (H) 6 - 19 ng/L   ESTIMATED GFR   Result Value Ref Range    GFR If African American >60 >60 mL/min/1.73 m 2    GFR If Non African American >60 >60 mL/min/1.73 m 2   COV-2, FLU A/B, AND RSV BY PCR (2-4 HOURS CEPHEID): Collect NP swab in VTM    Specimen: Nasopharyngeal; Respirate   Result Value Ref Range    Influenza virus A RNA Negative Negative    Influenza virus B, PCR Negative Negative    RSV, PCR Negative Negative     SARS-CoV-2 by PCR NotDetected     SARS-CoV-2 Source NP Swab    EKG (NOW)   Result Value Ref Range    Report       [facility] Emergency Dept.    Test Date:  2022  Pt Name:    MARCOS LOGAN                 Department: ER  MRN:        [id number]                      Room:       ORTHO  Gender:     Male                         Technician: [id number]  :        [id number]                   Requested By:ER TRIAGE PROTOCOL  Order #:    [id number]                    Reading MD: AMI ELIZABETH MD    Measurements  Intervals                                Axis  Rate:       108                          P:          66  AZ:         213                          QRS:        -13  QRSD:       114                          T:          14  QT:         372  QTc:        499    Interpretive Statements  Sinus tachycardia  Borderline prolonged AZ interval  Incomplete right bundle branch block  Abnormal R-wave progression, late transition  Borderline prolonged QT interval  Compared to ECG 08/10/2021 10:23:16  Atrial-paced complex(es) or rhythm no longer present  Ventricular-paced complex(es) or rhythm no marie jaycee present  Electronically Signed On 1-3-2022 15:48:22 PST by AMI ELIZABETH MD         All labs reviewed by me.    EKG  12 lead EKG interpreted by me as shown above.     RADIOLOGY  CT-CTA CHEST PULMONARY ARTERY W/ RECONS   Final Result      1.  No CT evidence of pulmonary embolism.      2.  Cholelithiasis.      3.  Calcific atherosclerosis.      4.  1 cm mass within each adrenal gland. These could represent adrenal adenomas. Follow-up noncontrast CT of the adrenal glands could be obtained for further evaluation if clinically indicated.            DX-CHEST-PORTABLE (1 VIEW)   Final Result   Addendum 1 of 1   Signed      Final      1.  No acute cardiac or pulmonary abnormalities are identified.        The radiologist's interpretation of all radiological studies have been reviewed by me.    COURSE & MEDICAL DECISION  MAKING  Nursing notes, VS, PMSFHx reviewed in chart.    11:19 AM - Ordered DX-Chest, CBC with diff, CMP, Troponin, and EKG.     11:55 AM - Ordered Estimated GFR.    3:49 PM - Patient seen and examined at bedside. Patient will be treated with Unasyn 3 g and Zithromax 500 mg. Ordered CT-CTA Chest Pulmonary Artery, COV-2, Flu A/B, and RSV by PCR to evaluate his symptoms. The differential diagnoses include but are not limited to: cardiac ischemia, sepsis, pulmonary embolism, COPD exacerbation    5:18 PM - Patient was reevaluated at bedside. Discussed lab and radiology results with the patient and informed them about the plan for hospitalization at this time for further evaluation of the chest pain. Patient verbalizes understanding and agreement to this plan of care.     6:pm PM - Paged Hospitalist.     7:00pm PM -  I discussed the patient's case and the above findings with (Hospitalist) who agrees to assess the patient for hospitalization.     Heart Score is: 6    DISPOSITION:  Patient will be hospitalized in guarded condition.    FINAL IMPRESSION  1. Chest pain, unspecified type    2. Leukocytosis, unspecified type    3. SIRS (systemic inflammatory response syndrome) (HCC)         CRITICAL CARE  The very real possibilty of a deterioration of this patient's condition required the highest level of my preparedness for sudden, emergent intervention.  I provided critical care services, which included medication orders, frequent reevaluations of the patient's condition and response to treatment, ordering and reviewing test results, and discussing the case with various consultants.  The critical care time associated with the care of the patient was 40 minutes. Review chart for interventions. This time is exclusive of any other billable procedures.      Dara GLEZ (Iker), am scribing for, and in the presence of, Monique Mares M.D..    Electronically signed by: Dara Gandhi (Iker), 1/3/2022    Monique GLEZ  M.D. personally performed the services described in this documentation, as scribed by Dara Gandhi in my presence, and it is both accurate and complete.    The note accurately reflects work and decisions made by me.  Monique Mares M.D.  1/3/2022  8:08 PM     rest

## 2022-02-02 ENCOUNTER — TRANSCRIPTION ENCOUNTER (OUTPATIENT)
Age: 52
End: 2022-02-02

## 2022-03-01 ENCOUNTER — APPOINTMENT (OUTPATIENT)
Dept: TRANSGENDER CARE | Facility: CLINIC | Age: 52
End: 2022-03-01
Payer: COMMERCIAL

## 2022-03-01 VITALS
HEIGHT: 65 IN | BODY MASS INDEX: 28.99 KG/M2 | WEIGHT: 174 LBS | OXYGEN SATURATION: 96 % | SYSTOLIC BLOOD PRESSURE: 146 MMHG | TEMPERATURE: 98.2 F | HEART RATE: 75 BPM | DIASTOLIC BLOOD PRESSURE: 98 MMHG

## 2022-03-01 DIAGNOSIS — Z13.29 ENCOUNTER FOR SCREENING FOR OTHER SUSPECTED ENDOCRINE DISORDER: ICD-10-CM

## 2022-03-01 PROCEDURE — 99214 OFFICE O/P EST MOD 30 MIN: CPT | Mod: 25

## 2022-03-01 PROCEDURE — 36415 COLL VENOUS BLD VENIPUNCTURE: CPT

## 2022-03-01 NOTE — PHYSICAL EXAM
[No Acute Distress] : no acute distress [Well Nourished] : well nourished [Well Developed] : well developed [Well-Appearing] : well-appearing [Normal Voice/Communication] : normal voice/communication [Normal Sclera/Conjunctiva] : normal sclera/conjunctiva [Normal Outer Ear/Nose] : the outer ears and nose were normal in appearance [Normal Oropharynx] : the oropharynx was normal [No JVD] : no jugular venous distention [No Lymphadenopathy] : no lymphadenopathy [No Respiratory Distress] : no respiratory distress  [No Accessory Muscle Use] : no accessory muscle use [Clear to Auscultation] : lungs were clear to auscultation bilaterally [Normal Rate] : normal rate  [Regular Rhythm] : with a regular rhythm [No Edema] : there was no peripheral edema [Soft] : abdomen soft [Non Tender] : non-tender [Non-distended] : non-distended [No HSM] : no HSM [Normal Bowel Sounds] : normal bowel sounds [Normal Supraclavicular Nodes] : no supraclavicular lymphadenopathy [Normal Posterior Cervical Nodes] : no posterior cervical lymphadenopathy [Normal Anterior Cervical Nodes] : no anterior cervical lymphadenopathy [Speech Grossly Normal] : speech grossly normal [Normal Affect] : the affect was normal [Alert and Oriented x3] : oriented to person, place, and time [Normal Mood] : the mood was normal [Normal Insight/Judgement] : insight and judgment were intact

## 2022-03-01 NOTE — PLAN
[FreeTextEntry1] : TRANSCARE:\par - Labs drawn to assess current HRT regimen.\par - Patient aware that a mid-cycle level is most beneficial and understand that he may require further lab work to assess levels in accordance with current injection schedule.\par - Will follow up with lab work once resulted and plan to continue on current HRT regimen unless otherwise indicated.\par - Patient to return in 1- 2 weeks for fasting blood work to assess cholesterol.\par - All questions answered and concerns addressed at this time.

## 2022-03-01 NOTE — HISTORY OF PRESENT ILLNESS
[FreeTextEntry1] : follow up visit [de-identified] : DREAD ERVIN is a 51 year old, transmale seen on 03/01/2022 for  follow up HRT visit.\par \par As per patient he typically does his own injections on Fridays without issues.  \par Feels good on current HRT regimen.\par Had a hysterectomy in 10/21 with Dr. Olivier and is satisfied with results.\par Still taking Propranolol for HTN and feels good overall - seeing Juan Pablo Fernandez for HTN and hypothyroid.\par Will be seeing him in 1 week for follow up.\par \par No signs/symptoms of acute illness. No fever, myalgias, throat pain, meningismus.\par Patient denies any known exposure or signs/symptoms of COVID-19 at this time. \par

## 2022-03-03 LAB
25(OH)D3 SERPL-MCNC: 31.5 NG/ML
ALBUMIN SERPL ELPH-MCNC: 4.9 G/DL
ALP BLD-CCNC: 92 U/L
ALT SERPL-CCNC: 17 U/L
ANION GAP SERPL CALC-SCNC: 11 MMOL/L
AST SERPL-CCNC: 24 U/L
BILIRUB SERPL-MCNC: 0.7 MG/DL
BUN SERPL-MCNC: 13 MG/DL
CALCIUM SERPL-MCNC: 9.7 MG/DL
CHLORIDE SERPL-SCNC: 102 MMOL/L
CO2 SERPL-SCNC: 28 MMOL/L
CREAT SERPL-MCNC: 0.84 MG/DL
EGFR: 105 ML/MIN/1.73M2
ESTIMATED AVERAGE GLUCOSE: 114 MG/DL
ESTRADIOL SERPL-MCNC: 41 PG/ML
FSH SERPL-MCNC: 0.5 IU/L
GLUCOSE SERPL-MCNC: 99 MG/DL
HBA1C MFR BLD HPLC: 5.6 %
LH SERPL-ACNC: 0.6 IU/L
POTASSIUM SERPL-SCNC: 4.9 MMOL/L
PROLACTIN SERPL-MCNC: 21.5 NG/ML
PROT SERPL-MCNC: 7.1 G/DL
SODIUM SERPL-SCNC: 141 MMOL/L
TESTOST SERPL-MCNC: 660 NG/DL
TSH SERPL-ACNC: 1.87 UIU/ML

## 2022-03-04 LAB — SHBG SERPL-SCNC: 23.6 NMOL/L

## 2022-04-24 ENCOUNTER — TRANSCRIPTION ENCOUNTER (OUTPATIENT)
Age: 52
End: 2022-04-24

## 2022-06-07 NOTE — ASU PREOPERATIVE ASSESSMENT, ADULT ([facility] ONLY) - TEACHING/LEARNING FACTORS INFLUENCE READINESS TO LEARN
6/7/2022 10:37AM Spoke with Grisel and advised of updated COVID-19 testing requirements for the 6/14 procedure with Dr. Cortes. At-home antigen testing will be performed on Sunday or Monday. The family will bring a photo of the negative test result to the hospital on the day of the procedure. If testing is positive, they will call our office right away to reschedule.    Before Your Procedure or Hospital Admission: Testing for COVID-19 Rev 5/26/22 was mailed to the patient today.     none

## 2022-06-29 ENCOUNTER — APPOINTMENT (OUTPATIENT)
Dept: GASTROENTEROLOGY | Facility: CLINIC | Age: 52
End: 2022-06-29

## 2022-12-05 ENCOUNTER — APPOINTMENT (OUTPATIENT)
Dept: TRANSGENDER CARE | Facility: CLINIC | Age: 52
End: 2022-12-05

## 2022-12-05 VITALS — SYSTOLIC BLOOD PRESSURE: 140 MMHG | DIASTOLIC BLOOD PRESSURE: 100 MMHG

## 2022-12-05 VITALS
SYSTOLIC BLOOD PRESSURE: 152 MMHG | BODY MASS INDEX: 29.49 KG/M2 | HEART RATE: 63 BPM | WEIGHT: 177 LBS | TEMPERATURE: 98.2 F | HEIGHT: 65 IN | DIASTOLIC BLOOD PRESSURE: 100 MMHG | OXYGEN SATURATION: 98 %

## 2022-12-05 DIAGNOSIS — Z13.220 ENCOUNTER FOR SCREENING FOR LIPOID DISORDERS: ICD-10-CM

## 2022-12-05 PROCEDURE — 99214 OFFICE O/P EST MOD 30 MIN: CPT

## 2022-12-05 RX ORDER — PROPRANOLOL HYDROCHLORIDE 80 MG/1
80 TABLET ORAL DAILY
Refills: 0 | Status: COMPLETED | COMMUNITY
Start: 2021-07-15 | End: 2022-12-05

## 2022-12-05 RX ORDER — TESTOSTERONE 30 MG/1.5ML
SOLUTION TOPICAL
Refills: 0 | Status: COMPLETED | COMMUNITY
End: 2022-12-05

## 2022-12-05 RX ORDER — SUMATRIPTAN 25 MG/1
25 TABLET, FILM COATED ORAL
Qty: 6 | Refills: 0 | Status: ACTIVE | COMMUNITY
Start: 2022-09-09

## 2022-12-05 RX ORDER — OMEPRAZOLE 20 MG/1
TABLET, DELAYED RELEASE ORAL
Refills: 0 | Status: COMPLETED | COMMUNITY
End: 2022-12-05

## 2022-12-05 RX ORDER — PROPRANOLOL HYDROCHLORIDE 80 MG/1
TABLET ORAL
Refills: 0 | Status: COMPLETED | COMMUNITY
End: 2022-12-05

## 2022-12-05 RX ORDER — PANTOPRAZOLE 40 MG/1
40 TABLET, DELAYED RELEASE ORAL
Qty: 90 | Refills: 0 | Status: ACTIVE | COMMUNITY
Start: 2022-11-03

## 2022-12-05 NOTE — PLAN
[FreeTextEntry1] : \par Gender Dysphoria: will send in med renewal with correct dosing as per pt. Will defer labs today as patient has been off regimen. Patient provided with blood work script in order for us to get mid cycle testosterone level once he does injection. Will order routine blood work and f/u results to patient once made available. Advised patient to call with any questions or concerns. Patient verbalized understanding. \par \par HTN: discussed still very elevated, seeing pcp today advised to discuss increasing losaratan. advised patient testosterone could be contributing to the elevated bp, will need to monitor closely. Patient verbalized understanding. Counseled patient on risks of testosterone in setting of htn, Patient verbalized understanding. Will continue to monitor. Advised patient to call with any questions or concerns. Patient verbalized understanding. \par \par \par Educated pt to go to hospital with any worst headache of life/thunderclap headache, gait disturbances, chest pain, palpitations, acute neuro changes (slurred speech, muscle weakness, numbness/tingling, visual disturbances etc.) .Advised patient to call with any questions or concerns. Patient verbalized understanding.

## 2022-12-05 NOTE — PHYSICAL EXAM
[Normal Sclera/Conjunctiva] : normal sclera/conjunctiva [Normal Gait] : normal gait [Normal] : affect was normal and insight and judgment were intact [No Edema] : there was no peripheral edema [No Extremity Clubbing/Cyanosis] : no extremity clubbing/cyanosis

## 2022-12-05 NOTE — HISTORY OF PRESENT ILLNESS
[FreeTextEntry1] : f/u [de-identified] : Patient reports feeling well, no major changes since last visit. He does report low energy unsure if dosage is acceptable. He reports he did miss injection past saturday late, needs renewal.\par \par Last injection:patient reports that he is injecting 0.375 ml every 2 weeks not weekly. His last inj was sat 11/19/22\par \par Patient reports that he is seeing PCP for HTN, has follow up this afternoon. Losartan 50 mg 1qd was added to regimen but still elevated, he is not checking bp at home.\par \par Pt denies any SOB, cough, chest pain, palpitations, N/V/D/C, fever, or chills. No other health concerns today.

## 2022-12-14 ENCOUNTER — NON-APPOINTMENT (OUTPATIENT)
Age: 52
End: 2022-12-14

## 2022-12-27 ENCOUNTER — TRANSCRIPTION ENCOUNTER (OUTPATIENT)
Age: 52
End: 2022-12-27

## 2022-12-27 LAB
ALBUMIN SERPL ELPH-MCNC: 4.6 G/DL
ALP BLD-CCNC: 89 U/L
ALT SERPL-CCNC: 17 U/L
ANION GAP SERPL CALC-SCNC: 11 MMOL/L
AST SERPL-CCNC: 18 U/L
BASOPHILS # BLD AUTO: 0.04 K/UL
BASOPHILS NFR BLD AUTO: 0.5 %
BILIRUB SERPL-MCNC: 0.8 MG/DL
BUN SERPL-MCNC: 14 MG/DL
CALCIUM SERPL-MCNC: 9.5 MG/DL
CHLORIDE SERPL-SCNC: 105 MMOL/L
CHOLEST SERPL-MCNC: 192 MG/DL
CO2 SERPL-SCNC: 29 MMOL/L
CREAT SERPL-MCNC: 0.86 MG/DL
EGFR: 104 ML/MIN/1.73M2
EOSINOPHIL # BLD AUTO: 0.13 K/UL
EOSINOPHIL NFR BLD AUTO: 1.6 %
ESTRADIOL SERPL-MCNC: 22 PG/ML
GLUCOSE SERPL-MCNC: 87 MG/DL
HCT VFR BLD CALC: 51 %
HDLC SERPL-MCNC: 34 MG/DL
HGB BLD-MCNC: 16.8 G/DL
IMM GRANULOCYTES NFR BLD AUTO: 0.5 %
LDLC SERPL CALC-MCNC: 104 MG/DL
LYMPHOCYTES # BLD AUTO: 1.86 K/UL
LYMPHOCYTES NFR BLD AUTO: 23.1 %
MAN DIFF?: NORMAL
MCHC RBC-ENTMCNC: 29.2 PG
MCHC RBC-ENTMCNC: 32.9 GM/DL
MCV RBC AUTO: 88.7 FL
MONOCYTES # BLD AUTO: 0.7 K/UL
MONOCYTES NFR BLD AUTO: 8.7 %
NEUTROPHILS # BLD AUTO: 5.29 K/UL
NEUTROPHILS NFR BLD AUTO: 65.6 %
NONHDLC SERPL-MCNC: 159 MG/DL
PLATELET # BLD AUTO: 252 K/UL
POTASSIUM SERPL-SCNC: 4.8 MMOL/L
PROT SERPL-MCNC: 7 G/DL
RBC # BLD: 5.75 M/UL
RBC # FLD: 12.3 %
SHBG SERPL-SCNC: 21.4 NMOL/L
SODIUM SERPL-SCNC: 145 MMOL/L
TESTOST SERPL-MCNC: 331 NG/DL
TRIGL SERPL-MCNC: 275 MG/DL
TSH SERPL-ACNC: 1.16 UIU/ML
WBC # FLD AUTO: 8.06 K/UL

## 2023-04-06 NOTE — ASU PATIENT PROFILE, ADULT - SITE
This is a 64 y/o F, Malay-Speaking, from home, ambulates independently. She has PMHx of HTN, DM, HLD, asthma PE (completed Xarelto in 2021), SIADH. She presented to the ED after 1 week of generalized weakness with associated shortness of breath, non-productive cough, subjective fevers. She also had 2 episodes of intermittent nonbilious non-bloody vomiting. She denies any recent travel nor sick contacts. She claims to be vaccinated with COVID (with booster), Influenza and Pneumonia. She denies any exposure nor past treatment with Pulmonary Tuberculosis.     In the ED, VS showed BP 90/63, HR 93, RR 18, T 97.8 (36.6), O2 94%. She had febrile episodes with Tmax 101.7 F. Labs were pertinent for WBC 30.62, Na 131, Cr 1.43, , AST 55, UA was negative although she was complaining of sometimes burning urination. CT Chest showed consolidative opacity measuring 3.4 x 3.4 cm, Surrounding sub-cm cluster of nodules adjacent to dominant opacity. 5 mm right upper lobe nodule. Reidentified bilateral streak, subsegmental groundglass opacities or reticulation. CT Abd showed normal liver, heterogeneously enhancing bilateral kidneys containing too small to characterize hypodensities. Blood and Urine cultures were sent. She was given 1L NS, Vancomycin and Zosyn, Zofran.     Sepsis due to pneumonia.   ·  Plan: p/w gen weakness, shortness of breath, cough, vomiting  fever, HR > 90, WBC 30.62 C (met sepsis criteria), lactate - wnl  CT Chest - upper RLL opacity (aspiration pneumonia likely from vomiting)  s/p 1L NS, s/p LR at 75 cc/hr x 20 hrs, s/p Vanc + Zosyn in ED  UA, legionella, strep - all negative  BCx NGTD, UCx Normal carola  - Switch Unasyn to Ceftriaxone to finish 7 day course, add on azithromycin  - C/w Mucinex 600mg BID standing and Duoneb q6hrs standing    Dr. Gaines ID Following  Dr. Rodriguez Pulmonology following.     Problem/Plan - 2:  ·  Problem: Pulmonary nodule.   ·  Plan: p/w generalized weakness, shortness of breath, cough  CT Chest - consolidative opacity measuring 3.4 x 3.4 cm, Surrounding sub-cm cluster of nodules adjacent to dominant opacity. 5 mm right upper lobe nodule  repeat CT Chest in 6 months  - F/u opx w/Dr. Mcclendon 2-4 weeks    Dr. Rodriguez Pulmonology following.     Problem/Plan - 3:  ·  Problem: DM (diabetes mellitus).   ·  Plan: Hx of DM - A1c 7.0  home meds - janumet, repaglinide, pioglitazone  hold home meds  start insulin sliding scale  POCT Glucose ACHS    C/w home meds on DC.     Problem/Plan - 4:  ·  Problem: HLD (hyperlipidemia).   ·  Plan: Hx of HLD  home med - atorvastatin  continue home med.     Problem/Plan - 5:  ·  Problem: HTN (hypertension).   ·  Plan: Hx of HTN  home meds - olmesartan, amlodipine, furosemide  hold olmesartan, furosemide for JERMAN and soft BP  continue amlodipine  BP monitoring.     Problem/Plan - 6:  ·  Problem: Normal anion gap metabolic acidosis.   ·  Plan: Likely secondary to recent IV fluids.     Problem/Plan - 7:  ·  Problem: JERMAN (acute kidney injury).   ·  Plan: RESOLVED  SCr - 1.43 > .87 (baseline 0.85)  s/p 2L NS bolus  start IVF LR at 75cc/hr x 20 hrs  likely pre-renal from sepsis, vomiting  monitor BMP  avoid nephrotoxic agents  hold olmesartan, furosemide.     Problem/Plan - 8:  ·  Problem: Asthma.   ·  Plan: Hx of asthma  home meds - Symbicort, Montelukast  continue home meds  start albuterol prn.     Problem/Plan - 9:  ·  Problem: Prophylactic measure.   ·  Plan: Heparin SQ for DVT Prophylaxis.   gyn This is a 66 y/o F, Syriac-Speaking, from home, ambulates independently. She has PMHx of HTN, DM, HLD, asthma PE (completed Xarelto in 2021), SIADH. She presented to the ED after 1 week of generalized weakness with associated shortness of breath, non-productive cough, subjective fevers. She also had 2 episodes of intermittent nonbilious non-bloody vomiting. She denies any recent travel nor sick contacts. She claims to be vaccinated with COVID (with booster), Influenza and Pneumonia. She denies any exposure nor past treatment with Pulmonary Tuberculosis.    In the ED, VS showed BP 90/63, HR 93, RR 18, T 97.8 (36.6), O2 94%. She had febrile episodes with Tmax 101.7 F. Labs were pertinent for WBC 30.62, Na 131, Cr 1.43, , AST 55, UA was negative although she was complaining of sometimes burning urination. CT Chest showed consolidative opacity measuring 3.4 x 3.4 cm, Surrounding sub-cm cluster of nodules adjacent to dominant opacity. 5 mm right upper lobe nodule. Reidentified bilateral streak, subsegmental groundglass opacities or reticulation. CT Abd showed normal liver, heterogeneously enhancing bilateral kidneys containing too small to characterize hypodensities. Blood and Urine cultures were sent. She was given 1L NS, Vancomycin and Zosyn, Zofran.    Admitted to medicine floors for sepsis 2/2 PNA workup. Legionella, mycoplasma negative. Pt started on Unasyn for concern of aspiration PNA 2/2 recent vomiting episodes. Pt was evaluated by Infectious disease who recommended Ceftriaxone.     Sepsis due to pneumonia.   ·  Plan: p/w gen weakness, shortness of breath, cough, vomiting  fever, HR > 90, WBC 30.62 C (met sepsis criteria), lactate - wnl  CT Chest - upper RLL opacity (aspiration pneumonia likely from vomiting)  s/p 1L NS, s/p LR at 75 cc/hr x 20 hrs, s/p Vanc + Zosyn in ED  UA, legionella, strep - all negative  BCx NGTD, UCx Normal carola  - Switch Unasyn to Ceftriaxone to finish 7 day course, add on azithromycin  - C/w Mucinex 600mg BID standing and Duoneb q6hrs standing    Dr. Gaines ID Following  Dr. Rodriguez Pulmonology following.     Problem/Plan - 2:  ·  Problem: Pulmonary nodule.   ·  Plan: p/w generalized weakness, shortness of breath, cough  CT Chest - consolidative opacity measuring 3.4 x 3.4 cm, Surrounding sub-cm cluster of nodules adjacent to dominant opacity. 5 mm right upper lobe nodule  repeat CT Chest in 6 months  - F/u opx w/Dr. Mcclendon 2-4 weeks    Dr. Rodriguez Pulmonology following.     Problem/Plan - 3:  ·  Problem: DM (diabetes mellitus).   ·  Plan: Hx of DM - A1c 7.0  home meds - janumet, repaglinide, pioglitazone  hold home meds  start insulin sliding scale  POCT Glucose ACHS    C/w home meds on DC.     Problem/Plan - 4:  ·  Problem: HLD (hyperlipidemia).   ·  Plan: Hx of HLD  home med - atorvastatin  continue home med.     Problem/Plan - 5:  ·  Problem: HTN (hypertension).   ·  Plan: Hx of HTN  home meds - olmesartan, amlodipine, furosemide  hold olmesartan, furosemide for JERMAN and soft BP  continue amlodipine  BP monitoring.     Problem/Plan - 6:  ·  Problem: Normal anion gap metabolic acidosis.   ·  Plan: Likely secondary to recent IV fluids.     Problem/Plan - 7:  ·  Problem: JERMAN (acute kidney injury).   ·  Plan: RESOLVED  SCr - 1.43 > .87 (baseline 0.85)  s/p 2L NS bolus  start IVF LR at 75cc/hr x 20 hrs  likely pre-renal from sepsis, vomiting  monitor BMP  avoid nephrotoxic agents  hold olmesartan, furosemide.     Problem/Plan - 8:  ·  Problem: Asthma.   ·  Plan: Hx of asthma  home meds - Symbicort, Montelukast  continue home meds  start albuterol prn.     Problem/Plan - 9:  ·  Problem: Prophylactic measure.   ·  Plan: Heparin SQ for DVT Prophylaxis.   This is a 64 y/o F, Sami-Speaking, from home, ambulates independently. She has PMHx of HTN, DM, HLD, asthma PE (completed Xarelto in 2021), SIADH. She presented to the ED after 1 week of generalized weakness with associated shortness of breath, non-productive cough, subjective fevers. She also had 2 episodes of intermittent nonbilious non-bloody vomiting. She denies any recent travel nor sick contacts. She claims to be vaccinated with COVID (with booster), Influenza and Pneumonia. She denies any exposure nor past treatment with Pulmonary Tuberculosis.    In the ED, VS showed BP 90/63, HR 93, RR 18, T 97.8 (36.6), O2 94%. She had febrile episodes with Tmax 101.7 F. Labs were pertinent for WBC 30.62, Na 131, Cr 1.43, , AST 55, UA was negative although she was complaining of sometimes burning urination. CT Chest showed consolidative opacity measuring 3.4 x 3.4 cm, Surrounding sub-cm cluster of nodules adjacent to dominant opacity. 5 mm right upper lobe nodule. Reidentified bilateral streak, subsegmental groundglass opacities or reticulation. CT Abd showed normal liver, heterogeneously enhancing bilateral kidneys containing too small to characterize hypodensities. Blood and Urine cultures were sent. She was given 1L NS, Vancomycin and Zosyn, Zofran.    Admitted to medicine floors for sepsis 2/2 PNA workup. Legionella, mycoplasma negative. Pt started on Unasyn for concern of aspiration PNA 2/2 recent vomiting episodes. Pt was evaluated by Infectious disease who recommended switching abx regiment to ceftriaxone and azithromycin for a total of 7 days tx. Pt completed 3 days of unasyn and 3 days of ceftriaxone in the hospital. Pt to complete 2 more days of cefpodoxime and 2 more days of azithromycin. Pt was followe dby a pulmonologist. Pt was given supoprtive treatment with Mucinex and Duoneb therapy.     Dr. Gaines ID Following  Dr. Rodriguez Pulmonology following.     Problem/Plan - 2:  ·  Problem: Pulmonary nodule.   ·  Plan: p/w generalized weakness, shortness of breath, cough  CT Chest - consolidative opacity measuring 3.4 x 3.4 cm, Surrounding sub-cm cluster of nodules adjacent to dominant opacity. 5 mm right upper lobe nodule  repeat CT Chest in 6 months  - F/u opx w/Dr. Mcclendon 2-4 weeks    Dr. Rodriguez Pulmonology following.     Problem/Plan - 3:  ·  Problem: DM (diabetes mellitus).   ·  Plan: Hx of DM - A1c 7.0  home meds - janumet, repaglinide, pioglitazone  hold home meds  start insulin sliding scale  POCT Glucose ACHS    C/w home meds on DC.     Problem/Plan - 4:  ·  Problem: HLD (hyperlipidemia).   ·  Plan: Hx of HLD  home med - atorvastatin  continue home med.     Problem/Plan - 5:  ·  Problem: HTN (hypertension).   ·  Plan: Hx of HTN  home meds - olmesartan, amlodipine, furosemide  hold olmesartan, furosemide for JERMAN and soft BP  continue amlodipine  BP monitoring.     Problem/Plan - 6:  ·  Problem: Normal anion gap metabolic acidosis.   ·  Plan: Likely secondary to recent IV fluids.     Problem/Plan - 7:  ·  Problem: JERMAN (acute kidney injury).   ·  Plan: RESOLVED  SCr - 1.43 > .87 (baseline 0.85)  s/p 2L NS bolus  start IVF LR at 75cc/hr x 20 hrs  likely pre-renal from sepsis, vomiting  monitor BMP  avoid nephrotoxic agents  hold olmesartan, furosemide.     Problem/Plan - 8:  ·  Problem: Asthma.   ·  Plan: Hx of asthma  home meds - Symbicort, Montelukast  continue home meds  start albuterol prn.     Problem/Plan - 9:  ·  Problem: Prophylactic measure.   ·  Plan: Heparin SQ for DVT Prophylaxis.   This is a 66 y/o F, Tajik-Speaking, from home, ambulates independently. She has PMHx of HTN, DM, HLD, asthma PE (completed Xarelto in 2021), SIADH. She presented to the ED after 1 week of generalized weakness with associated shortness of breath, non-productive cough, subjective fevers. She also had 2 episodes of intermittent nonbilious non-bloody vomiting. She denies any recent travel nor sick contacts. She claims to be vaccinated with COVID (with booster), Influenza and Pneumonia. She denies any exposure nor past treatment with Pulmonary Tuberculosis.    In the ED, VS showed BP 90/63, HR 93, RR 18, T 97.8 (36.6), O2 94%. She had febrile episodes with Tmax 101.7 F. Labs were pertinent for WBC 30.62, Na 131, Cr 1.43, , AST 55, UA was negative although she was complaining of sometimes burning urination. CT Chest showed consolidative opacity measuring 3.4 x 3.4 cm, Surrounding sub-cm cluster of nodules adjacent to dominant opacity. 5 mm right upper lobe nodule. Reidentified bilateral streak, subsegmental ground-glass opacities or reticulation. CT Abd showed normal liver, heterogeneously enhancing bilateral kidneys containing too small to characterize hypodensities. Blood and Urine cultures were sent. She was given 1L NS, Vancomycin and Zosyn, Zofran.    Admitted to medicine floors for sepsis 2/2 PNA workup. Legionella, mycoplasma negative. Pt started on Unasyn for concern of aspiration PNA 2/2 recent vomiting episodes. Pt was evaluated by Infectious disease who recommended switching abx regiment to ceftriaxone and azithromycin for a total of 7 days tx. Pt completed 3 days of Unasyn and 3 days of ceftriaxone in the hospital. Pt to complete 2 more days of cefpodoxime and 2 more days of azithromycin. Pt was followed by a pulmonologist. Pt was treated with Mucinex and Duoneb therapy. DM managed with insulin, A1c 7.0, pt to continue home meds on DC. HLD managed with home meds. HTN home meds Olmesartan and furosemide held in the hospital due to soft blood pressures. Pt to f/u opx with PCP for renewal of medications. Pt presented with JERMAN which resolved with IV fluids. Asthma managed with home meds.     Pt to follow up outpatient closely with pulmonologist Dr. Mcclendon in 2-4 weeks. CT chest repeat in 6 mo, per opx f/u.     Given patient's improved clinical status and current hemodynamic stability, decision was made to discharge. Discussed with attending. Please refer to patient's complete medical chart with documents for a full hospital course, for this is only a brief summary.   This is a 66 y/o F, Polish-Speaking, from home, ambulates independently. She has PMHx of HTN, DM, HLD, asthma PE (completed Xarelto in 2021), SIADH. She presented to the ED after 1 week of generalized weakness with associated shortness of breath, non-productive cough, subjective fevers. She also had 2 episodes of intermittent nonbilious non-bloody vomiting. She denies any recent travel nor sick contacts. She claims to be vaccinated with COVID (with booster), Influenza and Pneumonia. She denies any exposure nor past treatment with Pulmonary Tuberculosis.    In the ED, VS showed BP 90/63, HR 93, RR 18, T 97.8 (36.6), O2 94%. She had febrile episodes with Tmax 101.7 F. Labs were pertinent for WBC 30.62, Na 131, Cr 1.43, , AST 55, UA was negative although she was complaining of sometimes burning urination. CT Chest showed consolidative opacity measuring 3.4 x 3.4 cm, Surrounding sub-cm cluster of nodules adjacent to dominant opacity. 5 mm right upper lobe nodule. Reidentified bilateral streak, subsegmental ground-glass opacities or reticulation. CT Abd showed normal liver, heterogeneously enhancing bilateral kidneys containing too small to characterize hypodensities. Blood and Urine cultures were sent. She was given 1L NS, Vancomycin and Zosyn, Zofran.    Admitted to medicine floors for sepsis 2/2 PNA workup. Legionella, mycoplasma negative. Pt started on Unasyn for concern of aspiration PNA 2/2 recent vomiting episodes. Pt was evaluated by Infectious disease who recommended switching abx regiment to ceftriaxone and azithromycin for a total of 7 days tx. Pt completed 3 days of Unasyn and 3 days of ceftriaxone in the hospital. Pt to complete 3 more days of cefpodoxime and 3 more days of azithromycin. Pt was followed by a pulmonologist. Pt was treated with Mucinex and Duoneb therapy. DM managed with insulin, A1c 7.0, pt to continue home meds on DC. HLD managed with home meds. HTN home meds Olmesartan and furosemide held in the hospital due to soft blood pressures. Pt to f/u opx with PCP for renewal of medications. Pt presented with JERMAN which resolved with IV fluids. Asthma managed with home meds.     Pt to follow up outpatient closely with pulmonologist Dr. Mcclendon in 2-4 weeks. CT chest repeat in 6 mo, per opx f/u.     Given patient's improved clinical status and current hemodynamic stability, decision was made to discharge. Discussed with attending. Please refer to patient's complete medical chart with documents for a full hospital course, for this is only a brief summary.   This is a 66 y/o F, German-Speaking, from home, ambulates independently. She has PMHx of HTN, DM, HLD, asthma PE (completed Xarelto in 2021), SIADH. She presented to the ED after 1 week of generalized weakness with associated shortness of breath, non-productive cough, subjective fevers. She also had 2 episodes of intermittent nonbilious non-bloody vomiting. She denies any recent travel nor sick contacts. She claims to be vaccinated with COVID (with booster), Influenza and Pneumonia. She denies any exposure nor past treatment with Pulmonary Tuberculosis.    In the ED, VS showed BP 90/63, HR 93, RR 18, T 97.8 (36.6), O2 94%. She had febrile episodes with Tmax 101.7 F. Labs were pertinent for WBC 30.62, Na 131, Cr 1.43, , AST 55, UA was negative although she was complaining of sometimes burning urination. CT Chest showed consolidative opacity measuring 3.4 x 3.4 cm, Surrounding sub-cm cluster of nodules adjacent to dominant opacity. 5 mm right upper lobe nodule. Reidentified bilateral streak, subsegmental ground-glass opacities or reticulation. CT Abd showed normal liver, heterogeneously enhancing bilateral kidneys containing too small to characterize hypodensities. Blood and Urine cultures were sent. She was given 1L NS, Vancomycin and Zosyn, Zofran.    Admitted to medicine floors for sepsis 2/2 PNA workup. Legionella, mycoplasma negative. Pt started on Unasyn for concern of aspiration PNA 2/2 recent vomiting episodes. Pt was evaluated by Infectious disease who recommended switching abx regiment to ceftriaxone and azithromycin for a total of 7 days tx. Pt completed 3 days of Unasyn and 3 days of ceftriaxone in the hospital. Pt to complete 3 more days of cefpodoxime and 3 more days of azithromycin. Pt was followed by a pulmonologist. Pt was treated with Mucinex and Duoneb therapy. DM managed with insulin, A1c 7.0, pt to continue home meds on DC. HLD managed with home meds. HTN home meds held in the hospital due to soft blood pressures. Pt to f/u opx with PCP for renewal of medications. Pt presented with JERMAN which resolved with IV fluids. Asthma managed with home meds.     Pt to follow up outpatient closely with pulmonologist Dr. Mcclendon in 2-4 weeks. CT chest repeat in 6 mo, per opx f/u.     Given patient's improved clinical status and current hemodynamic stability, decision was made to discharge. Discussed with attending. Please refer to patient's complete medical chart with documents for a full hospital course, for this is only a brief summary.   This is a 66 y/o F, Romansh-Speaking, from home, ambulates independently. She has PMHx of HTN, DM, HLD, asthma PE (completed Xarelto in 2021), SIADH. She presented to the ED after 1 week of generalized weakness with associated shortness of breath, non-productive cough, subjective fevers. She also had 2 episodes of intermittent nonbilious non-bloody vomiting. She denies any recent travel nor sick contacts. She claims to be vaccinated with COVID (with booster), Influenza and Pneumonia. She denies any exposure nor past treatment with Pulmonary Tuberculosis.    In the ED, VS showed BP 90/63, HR 93, RR 18, T 97.8 (36.6), O2 94%. She had febrile episodes with Tmax 101.7 F. Labs were pertinent for WBC 30.62, Na 131, Cr 1.43, , AST 55, UA was negative although she was complaining of sometimes burning urination. CT Chest showed consolidative opacity measuring 3.4 x 3.4 cm, Surrounding sub-cm cluster of nodules adjacent to dominant opacity. 5 mm right upper lobe nodule. Reidentified bilateral streak, subsegmental ground-glass opacities or reticulation. CT Abd showed normal liver, heterogeneously enhancing bilateral kidneys containing too small to characterize hypodensities. Blood and Urine cultures were sent. She was given 1L NS, Vancomycin and Zosyn, Zofran.    Admitted to medicine floors for sepsis 2/2 PNA workup. Legionella, mycoplasma negative. Pt started on Unasyn for concern of aspiration PNA 2/2 recent vomiting episodes. Pt was evaluated by Infectious disease who recommended switching abx regiment to ceftriaxone and azithromycin for a total of 7 days tx. Pt to complete 3 more days of cefpodoxime and 3 more days of azithromycin. Pt was followed by a pulmonologist. Pt was treated with Mucinex and Duoneb therapy. DM managed with insulin, A1c 7.0, pt to continue home meds on DC. HLD managed with home meds. HTN, losartan held in the hospital due to soft blood pressures. Pt to f/u opx with PCP for renewal of medication. Pt presented with JERMAN which resolved with IV fluids. Asthma managed with home meds.     Pt to follow up outpatient closely with pulmonologist Dr. Mcclendon in 2-4 weeks. CT chest repeat in 6 mo, per opx f/u. Duoneb treatment sent home.    Given patient's improved clinical status and current hemodynamic stability, decision was made to discharge. Discussed with attending. Please refer to patient's complete medical chart with documents for a full hospital course, for this is only a brief summary.

## 2023-06-29 ENCOUNTER — APPOINTMENT (OUTPATIENT)
Dept: TRANSGENDER CARE | Facility: CLINIC | Age: 53
End: 2023-06-29
Payer: COMMERCIAL

## 2023-06-29 VITALS
SYSTOLIC BLOOD PRESSURE: 140 MMHG | DIASTOLIC BLOOD PRESSURE: 85 MMHG | HEART RATE: 62 BPM | BODY MASS INDEX: 29.82 KG/M2 | OXYGEN SATURATION: 96 % | RESPIRATION RATE: 18 BRPM | HEIGHT: 65 IN | WEIGHT: 179 LBS | TEMPERATURE: 98.4 F

## 2023-06-29 DIAGNOSIS — R51.9 HEADACHE, UNSPECIFIED: ICD-10-CM

## 2023-06-29 DIAGNOSIS — Z86.39 PERSONAL HISTORY OF OTHER ENDOCRINE, NUTRITIONAL AND METABOLIC DISEASE: ICD-10-CM

## 2023-06-29 DIAGNOSIS — R21 RASH AND OTHER NONSPECIFIC SKIN ERUPTION: ICD-10-CM

## 2023-06-29 PROCEDURE — 99214 OFFICE O/P EST MOD 30 MIN: CPT | Mod: 25

## 2023-06-29 PROCEDURE — 36415 COLL VENOUS BLD VENIPUNCTURE: CPT

## 2023-06-29 RX ORDER — PROPRANOLOL HYDROCHLORIDE 80 MG/1
80 CAPSULE, EXTENDED RELEASE ORAL DAILY
Refills: 0 | Status: COMPLETED | COMMUNITY
Start: 2021-12-28 | End: 2023-06-29

## 2023-06-29 RX ORDER — SYRINGE, DISPOSABLE, 1 ML
1 ML SYRINGE, EMPTY DISPOSABLE MISCELLANEOUS
Qty: 1 | Refills: 3 | Status: ACTIVE | COMMUNITY
Start: 2021-07-23 | End: 1900-01-01

## 2023-06-29 RX ORDER — NEEDLES, DISPOSABLE 25GX5/8"
18G X 1" NEEDLE, DISPOSABLE MISCELLANEOUS
Qty: 1 | Refills: 0 | Status: ACTIVE | COMMUNITY
Start: 2021-07-23 | End: 1900-01-01

## 2023-06-29 RX ORDER — PROPRANOLOL HYDROCHLORIDE 120 MG/1
120 CAPSULE, EXTENDED RELEASE ORAL DAILY
Refills: 0 | Status: ACTIVE | COMMUNITY
Start: 2023-06-29

## 2023-06-29 RX ORDER — NEEDLES, SAFETY 23GX1 1/2"
23G X 1-1/2" NEEDLE, DISPOSABLE MISCELLANEOUS
Qty: 1 | Refills: 0 | Status: ACTIVE | COMMUNITY
Start: 2021-07-23 | End: 1900-01-01

## 2023-06-29 NOTE — END OF VISIT
Postnasal Drip  Postnasal drip is the feeling of mucus going down the back of your throat. Mucus is a slimy substance that moistens and cleans your nose and throat, as well as the air pockets in face bones near your forehead and cheeks (sinuses). Small amounts of mucus pass from your nose and sinuses down the back of your throat all the time. This is normal. When you produce too much mucus or the mucus gets too thick, you can feel it.  Some common causes of postnasal drip include:  · Having more mucus because of:  ? A cold or the flu.  ? Allergies.  ? Cold air.  ? Certain medicines.  · Having more mucus that is thicker because of:  ? A sinus or nasal infection.  ? Dry air.  ? A food allergy.    Follow these instructions at home:  Relieving discomfort  · Gargle with a salt-water mixture 3-4 times a day or as needed. To make a salt-water mixture, completely dissolve ½-1 tsp of salt in 1 cup of warm water.  · If the air in your home is dry, use a humidifier to add moisture to the air.  · Use a saline spray or container (neti pot) to flush out the nose (nasal irrigation). These methods can help clear away mucus and keep the nasal passages moist.  General instructions  · Take over-the-counter and prescription medicines only as told by your health care provider.  · Follow instructions from your health care provider about eating or drinking restrictions. You may need to avoid caffeine.  · Avoid things that you know you are allergic to (allergens), like dust, mold, pollen, pets, or certain foods.  · Drink enough fluid to keep your urine pale yellow.  · Keep all follow-up visits as told by your health care provider. This is important.  Contact a health care provider if:  · You have a fever.  · You have a sore throat.  · You have difficulty swallowing.  · You have headache.  · You have sinus pain.  · You have a cough that does not go away.  · The mucus from your nose becomes thick and is green or yellow in color.  · You have  [Time Spent: ___ minutes] : I have spent [unfilled] minutes of time on the encounter. cold or flu symptoms that last more than 10 days.  Summary  · Postnasal drip is the feeling of mucus going down the back of your throat.  · If your health care provider approves, use nasal irrigation or a nasal spray 2?4 times a day.  · Avoid things that you know you are allergic to (allergens), like dust, mold, pollen, pets, or certain foods.  This information is not intended to replace advice given to you by your health care provider. Make sure you discuss any questions you have with your health care provider.  Document Released: 04/02/2018 Document Revised: 04/02/2018 Document Reviewed: 04/02/2018  [employer] Interactive Patient Education © 2019 [employer]  Cough, Adult  A cough helps to clear your throat and lungs. A cough may last only 2-3 weeks (acute), or it may last longer than 8 weeks (chronic). Many different things can cause a cough. A cough may be a sign of an illness or another medical condition.  Follow these instructions at home:  · Pay attention to any changes in your cough.  · Take medicines only as told by your doctor.  ? If you were prescribed an antibiotic medicine, take it as told by your doctor. Do not stop taking it even if you start to feel better.  ? Talk with your doctor before you try using a cough medicine.  · Drink enough fluid to keep your pee (urine) clear or pale yellow.  · If the air is dry, use a cold steam vaporizer or humidifier in your home.  · Stay away from things that make you cough at work or at home.  · If your cough is worse at night, try using extra pillows to raise your head up higher while you sleep.  · Do not smoke, and try not to be around smoke. If you need help quitting, ask your doctor.  · Do not have caffeine.  · Do not drink alcohol.  · Rest as needed.  Contact a doctor if:  · You have new problems (symptoms).  · You cough up yellow fluid (pus).  · Your cough does not get better after 2-3 weeks, or your cough gets worse.  · Medicine does not help your cough and  you are not sleeping well.  · You have pain that gets worse or pain that is not helped with medicine.  · You have a fever.  · You are losing weight and you do not know why.  · You have night sweats.  Get help right away if:  · You cough up blood.  · You have trouble breathing.  · Your heartbeat is very fast.  This information is not intended to replace advice given to you by your health care provider. Make sure you discuss any questions you have with your health care provider.  Document Released: 08/30/2012 Document Revised: 05/25/2017 Document Reviewed: 02/24/2016  [employer] Interactive Patient Education © 2019 [employer]  Pharyngitis  Pharyngitis is redness, pain, and swelling (inflammation) of the throat (pharynx). It is a very common cause of sore throat. Pharyngitis can be caused by a bacteria, but it is usually caused by a virus. Most cases of pharyngitis get better on their own without treatment.  What are the causes?  This condition may be caused by:  · Infection by viruses (viral). Viral pharyngitis spreads from person to person (is contagious) through coughing, sneezing, and sharing of personal items or utensils such as cups, forks, spoons, and toothbrushes.  · Infection by bacteria (bacterial). Bacterial pharyngitis may be spread by touching the nose or face after coming in contact with the bacteria, or through more intimate contact, such as kissing.  · Allergies. Allergies can cause buildup of mucus in the throat (post-nasal drip), leading to inflammation and irritation. Allergies can also cause blocked nasal passages, forcing breathing through the mouth, which dries and irritates the throat.    What increases the risk?  You are more likely to develop this condition if:  · You are 5-24 years old.  · You are exposed to crowded environments such as , school, or dormitory living.  · You live in a cold climate.  · You have a weakened disease-fighting (immune) system.    What are the signs or  symptoms?  Symptoms of this condition vary by the cause (viral, bacterial, or allergies) and can include:  · Sore throat.  · Fatigue.  · Low-grade fever.  · Headache.  · Joint pain and muscle aches.  · Skin rashes.  · Swollen glands in the throat (lymph nodes).  · Plaque-like film on the throat or tonsils. This is often a symptom of bacterial pharyngitis.  · Vomiting.  · Stuffy nose (nasal congestion).  · Cough.  · Red, itchy eyes (conjunctivitis).  · Loss of appetite.    How is this diagnosed?  This condition is often diagnosed based on your medical history and a physical exam. Your health care provider will ask you questions about your illness and your symptoms. A swab of your throat may be done to check for bacteria (rapid strep test). Other lab tests may also be done, depending on the suspected cause, but these are rare.  How is this treated?  This condition usually gets better in 3-4 days without medicine. Bacterial pharyngitis may be treated with antibiotic medicines.  Follow these instructions at home:  · Take over-the-counter and prescription medicines only as told by your health care provider.  ? If you were prescribed an antibiotic medicine, take it as told by your health care provider. Do not stop taking the antibiotic even if you start to feel better.  ? Do not give children aspirin because of the association with Reye syndrome.  · Drink enough water and fluids to keep your urine clear or pale yellow.  · Get a lot of rest.  · Gargle with a salt-water mixture 3-4 times a day or as needed. To make a salt-water mixture, completely dissolve ½-1 tsp of salt in 1 cup of warm water.  · If your health care provider approves, you may use throat lozenges or sprays to soothe your throat.  Contact a health care provider if:  · You have large, tender lumps in your neck.  · You have a rash.  · You cough up green, yellow-brown, or bloody spit.  Get help right away if:  · Your neck becomes stiff.  · You drool or are  unable to swallow liquids.  · You cannot drink or take medicines without vomiting.  · You have severe pain that does not go away, even after you take medicine.  · You have trouble breathing, and it is not caused by a stuffy nose.  · You have new pain and swelling in your joints such as the knees, ankles, wrists, or elbows.  Summary  · Pharyngitis is redness, pain, and swelling (inflammation) of the throat (pharynx).  · While pharyngitis can be caused by a bacteria, the most common causes are viral.  · Most cases of pharyngitis get better on their own without treatment.  · Bacterial pharyngitis is treated with antibiotic medicines.  This information is not intended to replace advice given to you by your health care provider. Make sure you discuss any questions you have with your health care provider.  Document Released: 12/18/2006 Document Revised: 01/23/2018 Document Reviewed: 01/23/2018  [employer] Interactive Patient Education © 2019 [employer]

## 2023-06-29 NOTE — PLAN
[FreeTextEntry1] : \par Gender Dysphoria: stable, follows pcp need to follow endo for f/u. Will order routine blood work and f/u results to patient once made available - will send [facility] message with results and dose adjustment if needed. F/u 4-6 months. Advised patient to call with any questions or concerns. Patient verbalized understanding. \par \par HTN: Advised patient risk of untreated htn including stroke, heart disease etc. and being on testosterone increases that risk. Educated imperative to control blood pressure to mitigate risks. Patient verbalized understanding.  Discussed with patient blood pressure is >130/80. Advised patient to implement lifestyle modifications with diet and exercise, low sodium diet. Can order blood pressure cuff and keep log of bp readings at home. Educated follow up with PCP for medication management. Educated pt to go to hospital with any worst headache of life/thunderclap headache, gait disturbances, chest pain, palpitations, acute neuro changes (slurred speech, muscle weakness, numbness/tingling, visual disturbances etc.) .Advised patient to call with any questions or concerns. Patient verbalized understanding.

## 2023-06-29 NOTE — HISTORY OF PRESENT ILLNESS
[FreeTextEntry1] : f/u [de-identified] : Patient reports feeling well since last visit, no major changes. 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 Patient reports he has been on gaht for last 3 years, stable no recent changes just some hair thinning.\par \par Last injection: last saturday 6/24/23\par \par Patient reports not checking blood pressure at home. He reports has not seen PCP in a while, going to schedule follow up. Patient reports increased headaches, using the sumatriptan. He also has rash, would like to see dermatology. Denies any acute neuro changes. \par \par Pt denies any SOB, cough, chest pain, palpitations, N/V/D/C, fever, or chills. No other health concerns today.

## 2023-06-30 ENCOUNTER — TRANSCRIPTION ENCOUNTER (OUTPATIENT)
Age: 53
End: 2023-06-30

## 2023-06-30 LAB
ALBUMIN SERPL ELPH-MCNC: 4.8 G/DL
ALP BLD-CCNC: 94 U/L
ALT SERPL-CCNC: 24 U/L
ANION GAP SERPL CALC-SCNC: 11 MMOL/L
AST SERPL-CCNC: 24 U/L
BILIRUB SERPL-MCNC: 0.7 MG/DL
BUN SERPL-MCNC: 15 MG/DL
CALCIUM SERPL-MCNC: 9.2 MG/DL
CHLORIDE SERPL-SCNC: 105 MMOL/L
CO2 SERPL-SCNC: 27 MMOL/L
CREAT SERPL-MCNC: 0.91 MG/DL
EGFR: 101 ML/MIN/1.73M2
ESTRADIOL SERPL-MCNC: 30 PG/ML
GLUCOSE SERPL-MCNC: 85 MG/DL
POTASSIUM SERPL-SCNC: 5.3 MMOL/L
PROT SERPL-MCNC: 6.9 G/DL
SODIUM SERPL-SCNC: 142 MMOL/L
TESTOST SERPL-MCNC: 442 NG/DL

## 2023-08-28 ENCOUNTER — APPOINTMENT (OUTPATIENT)
Dept: DERMATOLOGY | Facility: CLINIC | Age: 53
End: 2023-08-28

## 2023-10-04 ENCOUNTER — OFFICE (OUTPATIENT)
Dept: URBAN - METROPOLITAN AREA CLINIC 27 | Facility: CLINIC | Age: 53
Setting detail: OPHTHALMOLOGY
End: 2023-10-04
Payer: COMMERCIAL

## 2023-10-04 DIAGNOSIS — H16.223: ICD-10-CM

## 2023-10-04 DIAGNOSIS — H11.132: ICD-10-CM

## 2023-10-04 DIAGNOSIS — H01.001: ICD-10-CM

## 2023-10-04 DIAGNOSIS — H02.531: ICD-10-CM

## 2023-10-04 PROCEDURE — 92014 COMPRE OPH EXAM EST PT 1/>: CPT | Performed by: OPHTHALMOLOGY

## 2023-10-04 ASSESSMENT — CONFRONTATIONAL VISUAL FIELD TEST (CVF)
OS_FINDINGS: FULL
OD_FINDINGS: FULL

## 2023-10-04 ASSESSMENT — SPHEQUIV_DERIVED
OS_SPHEQUIV: -1
OD_SPHEQUIV: -1.125
OS_SPHEQUIV: -1.375
OD_SPHEQUIV: -1.625

## 2023-10-04 ASSESSMENT — KERATOMETRY
OS_K2POWER_DIOPTERS: 43.50
OD_K2POWER_DIOPTERS: 43.25
METHOD_AUTO_MANUAL: AUTO
OD_K1POWER_DIOPTERS: 43.00
OS_K1POWER_DIOPTERS: 43.50
OD_AXISANGLE_DEGREES: 74
OS_AXISANGLE_DEGREES: 90

## 2023-10-04 ASSESSMENT — REFRACTION_AUTOREFRACTION
OS_CYLINDER: +0.50
OD_SPHERE: -1.50
OD_CYLINDER: +0.75
OD_AXIS: 6
OS_SPHERE: -1.25
OS_AXIS: 161

## 2023-10-04 ASSESSMENT — AXIALLENGTH_DERIVED
OD_AL: 24.3888
OS_AL: 23.9881
OS_AL: 24.1401
OD_AL: 24.1824

## 2023-10-04 ASSESSMENT — REFRACTION_MANIFEST
OD_CYLINDER: +0.25
OD_AXIS: 010
OS_VA1: 20/20
OD_VA1: 20/20
OS_CYLINDER: +0.25
OS_SPHERE: -1.50
OD_SPHERE: -1.75
OS_AXIS: 155

## 2023-10-04 ASSESSMENT — REFRACTION_CURRENTRX
OS_SPHERE: -1.25
OD_OVR_VA: 20/
OD_SPHERE: -1.50
OS_CYLINDER: SPH
OD_CYLINDER: SPH
OS_OVR_VA: 20/

## 2023-10-04 ASSESSMENT — TONOMETRY
OS_IOP_MMHG: 18
OD_IOP_MMHG: 18

## 2023-10-04 ASSESSMENT — SUPERFICIAL PUNCTATE KERATITIS (SPK)
OD_SPK: T
OS_SPK: T

## 2023-10-04 ASSESSMENT — LID EXAM ASSESSMENTS
OS_BLEPHARITIS: LUL
OD_BLEPHARITIS: RUL

## 2023-10-04 ASSESSMENT — VISUAL ACUITY
OD_BCVA: 20/20
OS_BCVA: 20/20

## 2023-10-06 ENCOUNTER — APPOINTMENT (OUTPATIENT)
Dept: TRANSGENDER CARE | Facility: CLINIC | Age: 53
End: 2023-10-06
Payer: COMMERCIAL

## 2023-10-06 ENCOUNTER — LABORATORY RESULT (OUTPATIENT)
Age: 53
End: 2023-10-06

## 2023-10-06 VITALS
TEMPERATURE: 98.2 F | WEIGHT: 180 LBS | HEIGHT: 65 IN | SYSTOLIC BLOOD PRESSURE: 142 MMHG | OXYGEN SATURATION: 95 % | DIASTOLIC BLOOD PRESSURE: 100 MMHG | HEART RATE: 64 BPM | BODY MASS INDEX: 29.99 KG/M2

## 2023-10-06 DIAGNOSIS — L64.9 ANDROGENIC ALOPECIA, UNSPECIFIED: ICD-10-CM

## 2023-10-06 DIAGNOSIS — E22.1 HYPERPROLACTINEMIA: ICD-10-CM

## 2023-10-06 PROCEDURE — 99204 OFFICE O/P NEW MOD 45 MIN: CPT

## 2023-10-09 ENCOUNTER — NON-APPOINTMENT (OUTPATIENT)
Age: 53
End: 2023-10-09

## 2023-10-13 ENCOUNTER — APPOINTMENT (OUTPATIENT)
Dept: ORTHOPEDIC SURGERY | Facility: CLINIC | Age: 53
End: 2023-10-13
Payer: OTHER MISCELLANEOUS

## 2023-10-13 ENCOUNTER — TRANSCRIPTION ENCOUNTER (OUTPATIENT)
Age: 53
End: 2023-10-13

## 2023-10-13 VITALS — WEIGHT: 180 LBS | BODY MASS INDEX: 29.99 KG/M2 | HEIGHT: 65 IN

## 2023-10-13 DIAGNOSIS — Z86.39 PERSONAL HISTORY OF OTHER ENDOCRINE, NUTRITIONAL AND METABOLIC DISEASE: ICD-10-CM

## 2023-10-13 DIAGNOSIS — Z87.19 PERSONAL HISTORY OF OTHER DISEASES OF THE DIGESTIVE SYSTEM: ICD-10-CM

## 2023-10-13 DIAGNOSIS — S33.5XXA SPRAIN OF LIGAMENTS OF LUMBAR SPINE, INITIAL ENCOUNTER: ICD-10-CM

## 2023-10-13 DIAGNOSIS — I10 ESSENTIAL (PRIMARY) HYPERTENSION: ICD-10-CM

## 2023-10-13 LAB
ALBUMIN SERPL ELPH-MCNC: 5.1 G/DL
ALP BLD-CCNC: 94 U/L
ALT SERPL-CCNC: 19 U/L
ANION GAP SERPL CALC-SCNC: 10 MMOL/L
AST SERPL-CCNC: 23 U/L
BASOPHILS # BLD AUTO: 0.06 K/UL
BASOPHILS NFR BLD AUTO: 0.7 %
BILIRUB SERPL-MCNC: 0.9 MG/DL
BUN SERPL-MCNC: 14 MG/DL
CALCIUM SERPL-MCNC: 10.1 MG/DL
CHLORIDE SERPL-SCNC: 102 MMOL/L
CHOLEST SERPL-MCNC: 210 MG/DL
CO2 SERPL-SCNC: 28 MMOL/L
CREAT SERPL-MCNC: 0.82 MG/DL
EGFR: 105 ML/MIN/1.73M2
EOSINOPHIL # BLD AUTO: 0.18 K/UL
EOSINOPHIL NFR BLD AUTO: 2.2 %
ESTIMATED AVERAGE GLUCOSE: 111 MG/DL
ESTRADIOL SERPL-MCNC: 10 PG/ML
GLUCOSE SERPL-MCNC: 95 MG/DL
HBA1C MFR BLD HPLC: 5.5 %
HCT VFR BLD CALC: 55.1 %
HDLC SERPL-MCNC: 38 MG/DL
HGB BLD-MCNC: 17.9 G/DL
IMM GRANULOCYTES NFR BLD AUTO: 0.5 %
LDLC SERPL CALC-MCNC: 119 MG/DL
LYMPHOCYTES # BLD AUTO: 2.27 K/UL
LYMPHOCYTES NFR BLD AUTO: 27.6 %
MAN DIFF?: NORMAL
MCHC RBC-ENTMCNC: 30.4 PG
MCHC RBC-ENTMCNC: 32.5 GM/DL
MCV RBC AUTO: 93.7 FL
MONOCYTES # BLD AUTO: 0.65 K/UL
MONOCYTES NFR BLD AUTO: 7.9 %
MONOMERIC PROLACTIN (ICMA)*: 5.34 NG/ML
NEUTROPHILS # BLD AUTO: 5.03 K/UL
NEUTROPHILS NFR BLD AUTO: 61.1 %
NONHDLC SERPL-MCNC: 172 MG/DL
PERCENT MACROPROLACTIN: 6 %
PLATELET # BLD AUTO: 256 K/UL
POTASSIUM SERPL-SCNC: 5.2 MMOL/L
PROLACTIN SERPL-MCNC: 5.7 NG/ML
PROLACTIN, SERUM (ICMA)*: 5.7 NG/ML
PROT SERPL-MCNC: 7.3 G/DL
RBC # BLD: 5.88 M/UL
RBC # FLD: 12.6 %
SODIUM SERPL-SCNC: 139 MMOL/L
TESTOST SERPL-MCNC: 159 NG/DL
TRIGL SERPL-MCNC: 305 MG/DL
TSH SERPL-ACNC: 0.9 UIU/ML
WBC # FLD AUTO: 8.23 K/UL

## 2023-10-13 PROCEDURE — 99204 OFFICE O/P NEW MOD 45 MIN: CPT

## 2023-10-13 PROCEDURE — 72100 X-RAY EXAM L-S SPINE 2/3 VWS: CPT

## 2023-10-13 PROCEDURE — 71100 X-RAY EXAM RIBS UNI 2 VIEWS: CPT | Mod: LT

## 2023-10-14 ENCOUNTER — TRANSCRIPTION ENCOUNTER (OUTPATIENT)
Age: 53
End: 2023-10-14

## 2023-10-16 ENCOUNTER — NON-APPOINTMENT (OUTPATIENT)
Age: 53
End: 2023-10-16

## 2023-10-25 ENCOUNTER — NON-APPOINTMENT (OUTPATIENT)
Age: 53
End: 2023-10-25

## 2023-10-30 ENCOUNTER — APPOINTMENT (OUTPATIENT)
Dept: ORTHOPEDIC SURGERY | Facility: CLINIC | Age: 53
End: 2023-10-30
Payer: OTHER MISCELLANEOUS

## 2023-10-30 VITALS — HEIGHT: 65 IN | WEIGHT: 180 LBS | BODY MASS INDEX: 29.99 KG/M2

## 2023-10-30 PROCEDURE — 99213 OFFICE O/P EST LOW 20 MIN: CPT

## 2023-11-20 ENCOUNTER — APPOINTMENT (OUTPATIENT)
Dept: ORTHOPEDIC SURGERY | Facility: CLINIC | Age: 53
End: 2023-11-20
Payer: OTHER MISCELLANEOUS

## 2023-11-20 VITALS — WEIGHT: 180 LBS | BODY MASS INDEX: 29.99 KG/M2 | HEIGHT: 65 IN

## 2023-11-20 PROCEDURE — 99213 OFFICE O/P EST LOW 20 MIN: CPT

## 2023-12-18 ENCOUNTER — APPOINTMENT (OUTPATIENT)
Dept: ORTHOPEDIC SURGERY | Facility: CLINIC | Age: 53
End: 2023-12-18
Payer: OTHER MISCELLANEOUS

## 2023-12-18 VITALS — BODY MASS INDEX: 29.99 KG/M2 | WEIGHT: 180 LBS | HEIGHT: 65 IN

## 2023-12-18 DIAGNOSIS — S33.5XXD SPRAIN OF LIGAMENTS OF LUMBAR SPINE, SUBSEQUENT ENCOUNTER: ICD-10-CM

## 2023-12-18 DIAGNOSIS — S23.9XXA SPRAIN OF UNSPECIFIED PARTS OF THORAX, INITIAL ENCOUNTER: ICD-10-CM

## 2023-12-18 DIAGNOSIS — M51.36 OTHER INTERVERTEBRAL DISC DEGENERATION, LUMBAR REGION: ICD-10-CM

## 2023-12-18 PROCEDURE — 99213 OFFICE O/P EST LOW 20 MIN: CPT

## 2023-12-18 NOTE — HISTORY OF PRESENT ILLNESS
[2] : 2 [0] : 0 [Dull/Aching] : dull/aching [Full time] : Work status: full time [de-identified] : 10/08/23:     12/18/23:   F/U.  Is now over 10 weeks since injury. Doing much better. No longer c/o midback or rib pain. Finished PT. Back to work full duty.  11/20/23:   F/U.  Is now about 7 weeks after injury. Feeling about 85% better after attending PT 2x/week. Still OOW. Taking motrin prn.  10/30/23:   F/U.  Is now 3+ weeks after injury at work. Is scheduled to begin PT today.  Took the MDP x1 and feels about 65% improvement after this.  Is now taking ibuprofen. Is not working.   10/13/23   Case Initial visit for this 52 year old transgender male, works at [employer], lifted heavy trays on 10/8/23 where he injured lt side of his midback/rib area.. Following day was seen at  where he d/c'd on Flexeril prn. Has been OOW since then. Applied heat at home and started Motrin 600mg prn which has helped.    PMH:  No real prior back pain.  No diabetes. [] : no [FreeTextEntry1] : left rib and back [de-identified] : PT

## 2023-12-18 NOTE — PHYSICAL EXAM
[Normal Mood and Affect] : normal mood and affect [Able to Communicate] : able to communicate [Well Developed] : well developed [Well Nourished] : well nourished [NL (90)] : forward flexion 90 degrees [NL (45)] : extension 45 degrees [NL (40)] : right lateral bending 40 degrees [Left] : left rib [No bony abnormalities] : No bony abnormalities [NL (30)] : left lateral bending 30 degrees [] : non-antalgic [FreeTextEntry9] : Able to take a deep breath without pain. [de-identified] : midback pain. [FreeTextEntry1] : Minimal DDD L1-2.  Normal lower thoracic spine.  [de-identified] : left lateral bending 20 degrees

## 2023-12-18 NOTE — WORK
[Sprain/Strain] : sprain/strain [Was the competent medical cause of the injury] : was the competent medical cause of the injury [Are consistent with the injury] : are consistent with the injury [Consistent with my objective findings] : consistent with my objective findings [Total (100%)] : total (100%) [Does not reveal pre-existing condition(s) that may affect treatment/prognosis] : does not reveal pre-existing condition(s) that may affect treatment/prognosis [I provided the services listed above] :  I provided the services listed above. [Can return to work without limitations on ______] : can return to work without limitations on [unfilled] [FreeTextEntry1] : Good

## 2023-12-18 NOTE — PLAN
Addended by: FELIX FRIED on: 2/3/2022 10:30 AM     Modules accepted: Orders     [TextEntry] : We discussed at length with the patient the options for treatment.  We discussed conservative care including physical therapy, acupuncture, massage therapy and chiropractic care.  We discussed injection therapy and even surgical intervention should the patient fail conservative care.  We discussed, risks, benefits, complications, alternatives, outcomes and expectations.   All questions answered.  Continue HEP only  Back at work full duty.

## 2024-02-23 RX ORDER — TESTOSTERONE CYPIONATE 200 MG/ML
200 INJECTION, SOLUTION INTRAMUSCULAR
Qty: 12 | Refills: 0 | Status: ACTIVE | COMMUNITY
Start: 2021-07-15 | End: 1900-01-01

## 2024-03-03 NOTE — ED ADULT NURSE NOTE - FALLEN IN THE PAST
Airway       Patient location during procedure: OR       Procedure Start/Stop Times: 3/3/2024 12:39 PM  Staff -        Anesthesiologist:  Christoph Gresham MD       CRNA: Maribell Diaz APRN CRNA       Performed By: anesthesiologist  Consent for Airway        Urgency: elective  Indications and Patient Condition       Indications for airway management: phillip-procedural       Induction type:intravenous       Mask difficulty assessment: 1 - vent by mask    Final Airway Details       Final airway type: endotracheal airway       Successful airway: ETT - single and Oral  Endotracheal Airway Details        ETT size (mm): 7.0       Cuffed: yes       Cuff volume (mL): 8       Successful intubation technique: video laryngoscopy       VL Blade Size: MAC D Blade       Grade View of Cords: 1       Adjucts: stylet       Position: Right       Measured from: gums/teeth       Secured at (cm): 22       Bite block used: None    Post intubation assessment        Placement verified by: capnometry, equal breath sounds and chest rise        Number of attempts at approach: 2       Secured with: tape       Ease of procedure: easy       Dentition: Intact and Unchanged    Medication(s) Administered   Medication Administration Time: 3/3/2024 12:39 PM    Additional Comments       Used VL with Mac 3 blade and obtained grade III view; changed providers and patient intubated with VL D blade with grade I view; recommend D blade or glidescope for subsequent intubations.        
no

## 2024-04-01 ENCOUNTER — RX RENEWAL (OUTPATIENT)
Age: 54
End: 2024-04-01

## 2024-04-01 RX ORDER — FINASTERIDE 5 MG/1
5 TABLET, FILM COATED ORAL DAILY
Qty: 45 | Refills: 3 | Status: ACTIVE | COMMUNITY
Start: 2023-10-13 | End: 1900-01-01

## 2024-04-22 ENCOUNTER — OFFICE (OUTPATIENT)
Dept: URBAN - METROPOLITAN AREA CLINIC 35 | Facility: CLINIC | Age: 54
Setting detail: OPHTHALMOLOGY
End: 2024-04-22
Payer: COMMERCIAL

## 2024-04-22 DIAGNOSIS — H00.14: ICD-10-CM

## 2024-04-22 PROCEDURE — 92012 INTRM OPH EXAM EST PATIENT: CPT | Performed by: OPHTHALMOLOGY

## 2024-04-22 ASSESSMENT — LID EXAM ASSESSMENTS
OS_BLEPHARITIS: LUL
OD_BLEPHARITIS: RUL

## 2024-05-03 ENCOUNTER — APPOINTMENT (OUTPATIENT)
Dept: TRANSGENDER CARE | Facility: CLINIC | Age: 54
End: 2024-05-03

## 2024-05-08 ENCOUNTER — OFFICE (OUTPATIENT)
Dept: URBAN - METROPOLITAN AREA CLINIC 27 | Facility: CLINIC | Age: 54
Setting detail: OPHTHALMOLOGY
End: 2024-05-08
Payer: COMMERCIAL

## 2024-05-08 ENCOUNTER — RX ONLY (RX ONLY)
Age: 54
End: 2024-05-08

## 2024-05-08 DIAGNOSIS — H01.001: ICD-10-CM

## 2024-05-08 DIAGNOSIS — L03.213: ICD-10-CM

## 2024-05-08 DIAGNOSIS — H01.004: ICD-10-CM

## 2024-05-08 DIAGNOSIS — H00.14: ICD-10-CM

## 2024-05-08 PROCEDURE — 92012 INTRM OPH EXAM EST PATIENT: CPT | Mod: 25 | Performed by: OPHTHALMOLOGY

## 2024-05-08 PROCEDURE — 67800 REMOVE EYELID LESION: CPT | Mod: E1 | Performed by: OPHTHALMOLOGY

## 2024-05-08 PROCEDURE — 92285 EXTERNAL OCULAR PHOTOGRAPHY: CPT | Performed by: OPHTHALMOLOGY

## 2024-05-08 ASSESSMENT — LID EXAM ASSESSMENTS
OS_BLEPHARITIS: LUL
OD_BLEPHARITIS: RUL

## 2024-05-08 ASSESSMENT — CONFRONTATIONAL VISUAL FIELD TEST (CVF)
OD_FINDINGS: FULL
OS_FINDINGS: FULL

## 2024-06-12 ENCOUNTER — OFFICE (OUTPATIENT)
Dept: URBAN - METROPOLITAN AREA CLINIC 27 | Facility: CLINIC | Age: 54
Setting detail: OPHTHALMOLOGY
End: 2024-06-12
Payer: COMMERCIAL

## 2024-06-12 ENCOUNTER — APPOINTMENT (OUTPATIENT)
Dept: TRANSGENDER CARE | Facility: CLINIC | Age: 54
End: 2024-06-12
Payer: COMMERCIAL

## 2024-06-12 VITALS — DIASTOLIC BLOOD PRESSURE: 80 MMHG | SYSTOLIC BLOOD PRESSURE: 138 MMHG

## 2024-06-12 VITALS
OXYGEN SATURATION: 96 % | DIASTOLIC BLOOD PRESSURE: 110 MMHG | BODY MASS INDEX: 30.49 KG/M2 | HEIGHT: 65 IN | TEMPERATURE: 98.6 F | WEIGHT: 183 LBS | SYSTOLIC BLOOD PRESSURE: 150 MMHG | HEART RATE: 66 BPM

## 2024-06-12 DIAGNOSIS — Z00.00 ENCOUNTER FOR GENERAL ADULT MEDICAL EXAMINATION W/OUT ABNORMAL FINDINGS: ICD-10-CM

## 2024-06-12 DIAGNOSIS — F64.0 TRANSSEXUALISM: ICD-10-CM

## 2024-06-12 DIAGNOSIS — J30.9 ALLERGIC RHINITIS, UNSPECIFIED: ICD-10-CM

## 2024-06-12 DIAGNOSIS — Z12.83 ENCOUNTER FOR SCREENING FOR MALIGNANT NEOPLASM OF SKIN: ICD-10-CM

## 2024-06-12 DIAGNOSIS — M54.50 LOW BACK PAIN, UNSPECIFIED: ICD-10-CM

## 2024-06-12 DIAGNOSIS — I10 ESSENTIAL (PRIMARY) HYPERTENSION: ICD-10-CM

## 2024-06-12 DIAGNOSIS — L03.213: ICD-10-CM

## 2024-06-12 DIAGNOSIS — H00.14: ICD-10-CM

## 2024-06-12 DIAGNOSIS — E03.9 HYPOTHYROIDISM, UNSPECIFIED: ICD-10-CM

## 2024-06-12 DIAGNOSIS — Z12.11 ENCOUNTER FOR SCREENING FOR MALIGNANT NEOPLASM OF COLON: ICD-10-CM

## 2024-06-12 DIAGNOSIS — H01.001: ICD-10-CM

## 2024-06-12 DIAGNOSIS — H01.004: ICD-10-CM

## 2024-06-12 PROCEDURE — 92012 INTRM OPH EXAM EST PATIENT: CPT | Performed by: OPHTHALMOLOGY

## 2024-06-12 PROCEDURE — 36415 COLL VENOUS BLD VENIPUNCTURE: CPT

## 2024-06-12 PROCEDURE — 99396 PREV VISIT EST AGE 40-64: CPT

## 2024-06-12 RX ORDER — LOSARTAN POTASSIUM 100 MG/1
100 TABLET, FILM COATED ORAL
Qty: 90 | Refills: 1 | Status: ACTIVE | COMMUNITY
Start: 2022-11-01

## 2024-06-12 RX ORDER — METHYLPREDNISOLONE 4 MG/1
4 TABLET ORAL
Qty: 1 | Refills: 1 | Status: COMPLETED | COMMUNITY
Start: 2023-10-13 | End: 2024-06-12

## 2024-06-12 RX ORDER — FINASTERIDE 1 MG/1
1 TABLET ORAL DAILY
Qty: 90 | Refills: 1 | Status: COMPLETED | COMMUNITY
Start: 2023-10-06 | End: 2024-06-12

## 2024-06-12 ASSESSMENT — LID EXAM ASSESSMENTS
OD_BLEPHARITIS: RUL
OS_BLEPHARITIS: LUL

## 2024-06-12 ASSESSMENT — CONFRONTATIONAL VISUAL FIELD TEST (CVF)
OD_FINDINGS: FULL
OS_FINDINGS: FULL

## 2024-06-12 NOTE — PHYSICAL EXAM
[Normal Sclera/Conjunctiva] : normal sclera/conjunctiva [No Edema] : there was no peripheral edema [No Extremity Clubbing/Cyanosis] : no extremity clubbing/cyanosis [Normal Gait] : normal gait [Normal TMs] : both tympanic membranes were normal [Soft] : abdomen soft [Non Tender] : non-tender [Non-distended] : non-distended [Normal Bowel Sounds] : normal bowel sounds [Normal] : no rash [Coordination Grossly Intact] : coordination grossly intact [No Focal Deficits] : no focal deficits

## 2024-06-12 NOTE — HISTORY OF PRESENT ILLNESS
[FreeTextEntry1] : cpe [de-identified] : Patient here today for CPE  Patient is taking losartan 100 mg for blood pressure, took around 830 am this morning. He did have caffeine before coming in. He reports PCP increased and has been tolerating well. He sometimes forgets to take his medication. He is not fasting today  Patient reports pressure in ears for 2-3 weeks, not everyday. Patient has not been sick at all recently. He has no allergies, no discharge or pain from ear. He reports has been itchy.    He reports that he does a lot of lifting at work, has been having low back pain. 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 Last sexual encounter was saturday   Pt denies any SOB, cough, chest pain, palpitations, N/V/D/C, fever, or chills. No other health concerns today.

## 2024-06-12 NOTE — HEALTH RISK ASSESSMENT
[Patient declined mammogram] : Patient declined mammogram [Patient declined PAP Smear] : Patient declined PAP Smear [HIV test declined] : HIV test declined [Hepatitis C test declined] : Hepatitis C test declined [MammogramComments] : top surgery 3 years ago  [PapSmearComments] : total hysto  [ColonoscopyComments] : due for colon cancer screening

## 2024-06-12 NOTE — PLAN
[FreeTextEntry1] : Gender Dysphoria: stable, following endo. Will order routine blood work and f/u results to patient once made available - will send [facility] message with results and dose adjustment if needed. F/u 3 months. Advised patient to call with any questions or concerns. Patient verbalized understanding.   HTN: Advised patient risk of untreated htn including stroke, heart disease etc. and being on testosterone increases that risk. Educated imperative to control blood pressure to mitigate risks and need to take medication everyday. Patient verbalized understanding.  Discussed with patient blood pressure is >130/80. Advised patient to implement lifestyle modifications with diet and exercise, low sodium diet. Can order blood pressure cuff and keep log of bp readings at home. Educated follow up with PCP for medication management. Educated pt to go to hospital with any worst headache of life/thunderclap headache, gait disturbances, chest pain, palpitations, acute neuro changes (slurred speech, muscle weakness, numbness/tingling, visual disturbances etc.) .Advised patient to call with any questions or concerns. Patient verbalized understanding.    Low back pain: Educated patient to rest/avoid movements that cause pain. Advised patient they can alternate use tylenol otc prn pain relief as directed. If pain worsening, redness, swelling increase etc., discussed with patient to call office/return for follow up. Patient to go to PT for strengthening. Patient verbalized understanding.   Allergic rhinitis: Discussed with patient likely allergic/viral. Advised patient to use flonase 1 spray in each nostril once in the morning and once before bed, mucinex 600 mg q12h, zyrtec 10mg 1qd, and tylenol prn pain as per package instructions not to exceed 4g. Educated patient to call/f/u if no improvement or worsening symptoms. Advised to go to ED with any high fevers, dysphagia, sob, chest pain, palpitations, worsening headache etc. Advised patient to call with any questions or concerns. Patient verbalized understanding.   HCM: pt had 2 covid vaccines and flu shot in fall, counseled immunizations. Patient referred for colorectal cancer screening and skin cancer screening. No pap/mammo as pt had top surgery and total hysto. Will order routine blood work and f/u results to patient once made available. Advised patient to call with any questions or concerns. Patient verbalized understanding.

## 2024-06-13 ENCOUNTER — TRANSCRIPTION ENCOUNTER (OUTPATIENT)
Age: 54
End: 2024-06-13

## 2024-06-13 LAB
25(OH)D3 SERPL-MCNC: 28.4 NG/ML
ALBUMIN SERPL ELPH-MCNC: 5 G/DL
ALP BLD-CCNC: 112 U/L
ALT SERPL-CCNC: 30 U/L
ANION GAP SERPL CALC-SCNC: 13 MMOL/L
APPEARANCE: CLEAR
AST SERPL-CCNC: 27 U/L
BACTERIA: NEGATIVE /HPF
BASOPHILS # BLD AUTO: 0.06 K/UL
BASOPHILS NFR BLD AUTO: 0.6 %
BILIRUB SERPL-MCNC: 0.5 MG/DL
BILIRUBIN URINE: NEGATIVE
BLOOD URINE: NEGATIVE
BUN SERPL-MCNC: 12 MG/DL
CALCIUM SERPL-MCNC: 9.9 MG/DL
CAST: 0 /LPF
CHLORIDE SERPL-SCNC: 102 MMOL/L
CHOLEST SERPL-MCNC: 231 MG/DL
CO2 SERPL-SCNC: 25 MMOL/L
COLOR: YELLOW
CREAT SERPL-MCNC: 0.79 MG/DL
EGFR: 106 ML/MIN/1.73M2
EOSINOPHIL # BLD AUTO: 0.14 K/UL
EOSINOPHIL NFR BLD AUTO: 1.5 %
EPITHELIAL CELLS: 0 /HPF
ESTIMATED AVERAGE GLUCOSE: 111 MG/DL
ESTRADIOL SERPL-MCNC: 16 PG/ML
GLUCOSE QUALITATIVE U: NEGATIVE MG/DL
GLUCOSE SERPL-MCNC: 85 MG/DL
HBA1C MFR BLD HPLC: 5.5 %
HCT VFR BLD CALC: 51.5 %
HDLC SERPL-MCNC: 41 MG/DL
HGB BLD-MCNC: 16.8 G/DL
IMM GRANULOCYTES NFR BLD AUTO: 0.6 %
KETONES URINE: NEGATIVE MG/DL
LDLC SERPL CALC-MCNC: 127 MG/DL
LEUKOCYTE ESTERASE URINE: NEGATIVE
LYMPHOCYTES # BLD AUTO: 2.36 K/UL
LYMPHOCYTES NFR BLD AUTO: 25.5 %
MAN DIFF?: NORMAL
MCHC RBC-ENTMCNC: 30.7 PG
MCHC RBC-ENTMCNC: 32.6 GM/DL
MCV RBC AUTO: 94 FL
MICROSCOPIC-UA: NORMAL
MONOCYTES # BLD AUTO: 0.77 K/UL
MONOCYTES NFR BLD AUTO: 8.3 %
NEUTROPHILS # BLD AUTO: 5.86 K/UL
NEUTROPHILS NFR BLD AUTO: 63.5 %
NITRITE URINE: NEGATIVE
NONHDLC SERPL-MCNC: 189 MG/DL
PH URINE: 7.5
PLATELET # BLD AUTO: 295 K/UL
POTASSIUM SERPL-SCNC: 5.5 MMOL/L
PROT SERPL-MCNC: 7.5 G/DL
PROTEIN URINE: NEGATIVE MG/DL
RBC # BLD: 5.48 M/UL
RBC # FLD: 13.4 %
RED BLOOD CELLS URINE: 1 /HPF
SODIUM SERPL-SCNC: 139 MMOL/L
SPECIFIC GRAVITY URINE: 1.01
TESTOST SERPL-MCNC: 268 NG/DL
TRIGL SERPL-MCNC: 349 MG/DL
TSH SERPL-ACNC: 1.28 UIU/ML
UROBILINOGEN URINE: 0.2 MG/DL
WBC # FLD AUTO: 9.25 K/UL
WHITE BLOOD CELLS URINE: 1 /HPF

## 2024-06-21 ENCOUNTER — APPOINTMENT (OUTPATIENT)
Dept: TRANSGENDER CARE | Facility: CLINIC | Age: 54
End: 2024-06-21

## 2024-09-16 ENCOUNTER — APPOINTMENT (OUTPATIENT)
Dept: GASTROENTEROLOGY | Facility: CLINIC | Age: 54
End: 2024-09-16
Payer: COMMERCIAL

## 2024-09-16 VITALS
WEIGHT: 180 LBS | DIASTOLIC BLOOD PRESSURE: 103 MMHG | BODY MASS INDEX: 29.99 KG/M2 | HEART RATE: 62 BPM | SYSTOLIC BLOOD PRESSURE: 174 MMHG | HEIGHT: 65 IN

## 2024-09-16 DIAGNOSIS — K62.5 HEMORRHAGE OF ANUS AND RECTUM: ICD-10-CM

## 2024-09-16 DIAGNOSIS — E66.3 OVERWEIGHT: ICD-10-CM

## 2024-09-16 DIAGNOSIS — K21.9 GASTRO-ESOPHAGEAL REFLUX DISEASE W/OUT ESOPHAGITIS: ICD-10-CM

## 2024-09-16 DIAGNOSIS — Z80.0 FAMILY HISTORY OF MALIGNANT NEOPLASM OF DIGESTIVE ORGANS: ICD-10-CM

## 2024-09-16 DIAGNOSIS — Z12.11 ENCOUNTER FOR SCREENING FOR MALIGNANT NEOPLASM OF COLON: ICD-10-CM

## 2024-09-16 PROCEDURE — 82274 ASSAY TEST FOR BLOOD FECAL: CPT | Mod: QW

## 2024-09-16 PROCEDURE — 99243 OFF/OP CNSLTJ NEW/EST LOW 30: CPT | Mod: 25

## 2024-09-16 RX ORDER — SODIUM SULFATE, POTASSIUM SULFATE AND MAGNESIUM SULFATE 1.6; 3.13; 17.5 G/177ML; G/177ML; G/177ML
17.5-3.13-1.6 SOLUTION ORAL
Qty: 1 | Refills: 0 | Status: ACTIVE | COMMUNITY
Start: 2024-09-16 | End: 1900-01-01

## 2024-09-16 NOTE — ASSESSMENT
OFFICE VISIT    Patient: Sang Contreras   : 1959 MRN: [id number]    SUBJECTIVE:  Chief Complaint   Patient presents with   • Follow-up     3 week       Patient has given consent to record this visit for documentation in their clinical record.    A 63 year old male presents for the follow-up of hypertension, obesity and evaluation of a skin lesion.    HISTORY OF PRESENT ILLNESS:  Historian: Self, accompanied by significant others.    1. Skin lesion of left arm  Reports of a lesion on his left arm. No pain.     2. Pulmonary hypertension  Has been taking Amlodipine, Metolazone 2.5 mg on Monday/ Wednesday/ Friday, Torsemide 60 mg daily, Spironolactone 25 mg daily, Metoprolol  mg daily, and Hydralazine 50 mg thrice daily. Has swelling. The blood pressure measured today in the office by the MA is 124/78 mmHg.    3. Morbid obesity  Lost over 30 lb over the past month.    4. Sleep apnea  His ultrasound of heart showed higher right side heart pressure. Wakes up at night to urinate. Has impaired sleep occasionally. Will be waiting till August when he goes on to Medicare for a follow-up CT chest on the lung nodules, and pulmonary evaluation.    PAST MEDICAL HISTORY:  Past Medical History:   Diagnosis Date   • Anxiety     Panic Attacks   • Arthritis 2018    Bilateral hips   • Chronic kidney disease     stage 2   • Congestive cardiac failure (CMD)    • COPD (chronic obstructive pulmonary disease) (CMD)    • ED (erectile dysfunction)    • Fracture     Right collar bone, right wrist, left foot, right hand   • High cholesterol    • HTN (hypertension)    • IFG (impaired fasting glucose)    • Left inguinal hernia 2018   • Nodule of right lung    • Obesity    • Pulmonary hypertension (CMD)    • Smoking history     2018 - ~ Down to 1/2 PPD   • Unspecified vitamin D deficiency 2014    23.5   • Wears glasses      MEDICATIONS:  Current Outpatient Medications   Medication Sig Dispense Refill   • torsemide  [FreeTextEntry1] : 1.  Family history of colon cancer (aunt)--rule out colorectal neoplasm.  Episodic rectal bleeding statistically likely from hemorrhoids. 2.  Chronic GERD with reportedly negative EGD.  No alarm symptoms on PPI. 3.  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 4.  Hyperprolactinemia, hypothyroidism. 5.  Hypertension. 6.  Vitamin D insufficiency.  Plan: 1.  Medical records reviewed. 2.  Continue PPI as needed. 3.  Agree with need for colonoscopy--Procedure, rationale, material risks, anesthesia plan, and Suprep instructions were reviewed and brochure given. (DEMADEX) 20 MG tablet TAKE 3 TABLETS BY MOUTH EVERY DAY 90 tablet 0   • spironolactone (ALDACTONE) 25 MG tablet Take 1 tablet by mouth daily. 90 tablet 1   • potassium CHLORIDE (Klor-Con M) 20 MEQ thomas ER tablet Take 1 tablet by mouth in the morning and 1 tablet in the evening. 180 tablet 1   • metOLazone (ZAROXOLYN) 2.5 MG tablet TAKE 1 TABLET BY MOUTH 3 DAYS A WEEK. 12 tablet 17   • amLODIPine (NORVASC) 10 MG tablet TAKE 1 TABLET BY MOUTH EVERY DAY IN THE MORNING 30 tablet 11   • metoPROLOL succinate (TOPROL-XL) 100 MG 24 hr tablet TAKE 1 TABLET BY MOUTH EVERY MORNING. DO NOT START BEFORE APRIL 13, 2021 30 tablet 5   • atorvastatin (LIPITOR) 10 MG tablet TAKE 1 TABLET BY MOUTH EVERY DAY 30 tablet 11   • aspirin 81 MG chewable tablet Chew 1 tablet by mouth daily. Do not start before March 16, 2020. 30 tablet 5     No current facility-administered medications for this visit.     ALLERGIES:  Allergies as of 06/01/2023 - Reviewed 06/01/2023   Allergen Reaction Noted   • Celexa [kdc:citalopram] Other (See Comments) 01/03/2013   • Paxil [paroxetine hydrochloride] Other (See Comments) 01/03/2013   • Zestril [lisinopril] Cough      FAMILY HISTORY:  Family History   Problem Relation Age of Onset   • Heart disease Mother         MI, PTCA   • Cancer Mother 74        uterine   • Heart disease Father    • Hypertension Sister    • Patient is unaware of any medical problems Sister    • Patient is unaware of any medical problems Sister    • Patient is unaware of any medical problems Brother    • Patient is unaware of any medical problems Brother    • Patient is unaware of any medical problems Brother    • Hypertension Brother      SOCIAL HISTORY:  Social History     Tobacco Use   • Smoking status: Former     Current packs/day: 0.00     Types: Cigarettes     Quit date: 3/4/2020     Years since quitting: 3.2     Passive exposure: Past   • Smokeless tobacco: Never   Substance Use Topics   • Alcohol use: No     Alcohol/week: 0.0  standard drinks of alcohol   • Drug use: No     Past Surgical HISTORY  Past Surgical History:   Procedure Laterality Date   • Cardiac catherization Right 03/12/2020   • Fracture surgery  ~1979    Left foot - repair metatarsals   • Hip surgery Left 11/28/2018    Left NIGEL   • Inguinal hernia repair Left 04/07/2021   • Joint replacement Left 11/28/2018    hip   • Tonsillectomy  1966   • [place] tooth extraction         REVIEW OF SYSTEMS:  Constitutional: As Per HPI.  Respiratory: As Per HPI.  Skin: As Per HPI.    OBJECTIVE:  Visit Vitals  /78 (BP Location: LFA - Left forearm, Patient Position: Sitting, Cuff Size: Regular)   Pulse 76   Temp 98 °F (36.7 °C)   Resp 20   Ht 5' 7\"   Wt 136.1 kg (300 lb)   SpO2 95%   BMI 46.99 kg/m²       PHYSICAL EXAM:  Constitutional: Obese. Alert, in no acute distress and current vital signs reviewed.   Head and Face: Atraumatic and normocephalic.   ENT: Oropharynx normal. Normal appearing outer ear. Tympanic membranes are bilaterally clear, normal appearing nose and normal lips.   Neck: Normal appearing neck and supple neck.   Pulmonary: Breath sounds clear to auscultation bilaterally, but no respiratory distress and normal respiratory rate and effort.   Cardiovascular: Normal rate, regular rhythm, normal S1, normal S2 and edema was not present in the lower extremities.   Psychiatric: Alert and awake, interactive and mood/affect were appropriate.   Skin, Hair, Nails: On exam, there is a skin lesion on his left arm.    DIAGNOSTIC STUDIES:  LAB RESULTS:  Lab Services on 06/01/2023   Component Date Value Ref Range Status   • Sodium 06/01/2023 136  135 - 145 mmol/L Final   • Potassium 06/01/2023 3.2 (L)  3.4 - 5.1 mmol/L Final   • Chloride 06/01/2023 92 (L)  97 - 110 mmol/L Final   • Carbon Dioxide 06/01/2023 36 (H)  21 - 32 mmol/L Final   • Anion Gap 06/01/2023 11  7 - 19 mmol/L Final   • Glucose 06/01/2023 120 (H)  70 - 99 mg/dL Final   • BUN 06/01/2023 56 (H)  6 - 20 mg/dL Final   •  Creatinine 06/01/2023 1.31 (H)  0.67 - 1.17 mg/dL Final   • Glomerular Filtration Rate 06/01/2023 61  >=60 Final   • BUN/Cr 06/01/2023 43 (H)  7 - 25 Final   • Calcium 06/01/2023 10.8 (H)  8.4 - 10.2 mg/dL Final   • WBC 06/01/2023 14.0 (H)  4.2 - 11.0 K/mcL Final   • RBC 06/01/2023 5.25  4.50 - 5.90 mil/mcL Final   • HGB 06/01/2023 12.7 (L)  13.0 - 17.0 g/dL Final   • HCT 06/01/2023 41.1  39.0 - 51.0 % Final   • MCV 06/01/2023 78.3  78.0 - 100.0 fl Final   • MCH 06/01/2023 24.2 (L)  26.0 - 34.0 pg Final   • MCHC 06/01/2023 30.9 (L)  32.0 - 36.5 g/dL Final   • RDW-CV 06/01/2023 16.8 (H)  11.0 - 15.0 % Final   • RDW-SD 06/01/2023 47.5  39.0 - 50.0 fL Final   • PLT 06/01/2023 420  140 - 450 K/mcL Final   • NRBC 06/01/2023 0  <=0 /100 WBC Final   • Neutrophil, Percent 06/01/2023 79  % Final   • Lymphocytes, Percent 06/01/2023 12  % Final   • Mono, Percent 06/01/2023 7  % Final   • Eosinophils, Percent 06/01/2023 2  % Final   • Basophils, Percent 06/01/2023 0  % Final   • Immature Granulocytes 06/01/2023 0  % Final   • Absolute Neutrophils 06/01/2023 10.9 (H)  1.8 - 7.7 K/mcL Final   • Absolute Lymphocytes 06/01/2023 1.7  1.0 - 4.0 K/mcL Final   • Absolute Monocytes 06/01/2023 1.0 (H)  0.3 - 0.9 K/mcL Final   • Absolute Eosinophils  06/01/2023 0.3  0.0 - 0.5 K/mcL Final   • Absolute Basophils 06/01/2023 0.1  0.0 - 0.3 K/mcL Final   • Absolute Immature Granulocytes 06/01/2023 0.1  0.0 - 0.2 K/mcL Final   Lab Services on 05/09/2023   Component Date Value Ref Range Status   • Fasting Status 05/09/2023 12  0 - 999 Hours Final   • Sodium 05/09/2023 143  135 - 145 mmol/L Final   • Potassium 05/09/2023 4.0  3.4 - 5.1 mmol/L Final   • Chloride 05/09/2023 107  97 - 110 mmol/L Final   • Carbon Dioxide 05/09/2023 27  21 - 32 mmol/L Final   • Anion Gap 05/09/2023 13  7 - 19 mmol/L Final   • Glucose 05/09/2023 95  70 - 99 mg/dL Final   • BUN 05/09/2023 23 (H)  6 - 20 mg/dL Final   • Creatinine 05/09/2023 1.23 (H)  0.67 - 1.17 mg/dL  Final   • Glomerular Filtration Rate 05/09/2023 66  >=60 Final   • BUN/Cr 05/09/2023 19  7 - 25 Final   • Calcium 05/09/2023 9.8  8.4 - 10.2 mg/dL Final   • Bilirubin, Total 05/09/2023 0.4  0.2 - 1.0 mg/dL Final   • GOT/AST 05/09/2023 22  <=37 Units/L Final   • GPT/ALT 05/09/2023 31  <64 Units/L Final   • Alkaline Phosphatase 05/09/2023 127 (H)  45 - 117 Units/L Final   • Albumin 05/09/2023 3.6  3.6 - 5.1 g/dL Final   • Protein, Total 05/09/2023 6.9  6.4 - 8.2 g/dL Final   • Globulin 05/09/2023 3.3  2.0 - 4.0 g/dL Final   • A/G Ratio 05/09/2023 1.1  1.0 - 2.4 Final   • Cholesterol 05/09/2023 139  <=199 mg/dL Final   • Triglycerides 05/09/2023 99  <=149 mg/dL Final   • HDL 05/09/2023 40  >=40 mg/dL Final   • LDL 05/09/2023 79  <=129 mg/dL Final   • Non-HDL Cholesterol 05/09/2023 99  mg/dL Final   • Cholesterol/ HDL Ratio 05/09/2023 3.5  <=4.4 Final   • TSH 05/09/2023 4.119  0.350 - 5.000 mcUnits/mL Final   • Prostate Specific Antigen 05/09/2023 0.38  <=4.50 ng/mL Final   • Hemoglobin A1C 05/09/2023 5.9 (H)  4.5 - 5.6 % Final   • WBC 05/09/2023 10.4  4.2 - 11.0 K/mcL Final   • RBC 05/09/2023 4.89  4.50 - 5.90 mil/mcL Final   • HGB 05/09/2023 11.7 (L)  13.0 - 17.0 g/dL Final   • HCT 05/09/2023 39.2  39.0 - 51.0 % Final   • MCV 05/09/2023 80.2  78.0 - 100.0 fl Final   • MCH 05/09/2023 23.9 (L)  26.0 - 34.0 pg Final   • MCHC 05/09/2023 29.8 (L)  32.0 - 36.5 g/dL Final   • RDW-CV 05/09/2023 18.1 (H)  11.0 - 15.0 % Final   • RDW-SD 05/09/2023 51.6 (H)  39.0 - 50.0 fL Final   • PLT 05/09/2023 370  140 - 450 K/mcL Final   • NRBC 05/09/2023 0  <=0 /100 WBC Final   • Neutrophil, Percent 05/09/2023 77  % Final   • Lymphocytes, Percent 05/09/2023 13  % Final   • Mono, Percent 05/09/2023 6  % Final   • Eosinophils, Percent 05/09/2023 3  % Final   • Basophils, Percent 05/09/2023 0  % Final   • Immature Granulocytes 05/09/2023 1  % Final   • Absolute Neutrophils 05/09/2023 8.0 (H)  1.8 - 7.7 K/mcL Final   • Absolute Lymphocytes  05/09/2023 1.3  1.0 - 4.0 K/mcL Final   • Absolute Monocytes 05/09/2023 0.7  0.3 - 0.9 K/mcL Final   • Absolute Eosinophils  05/09/2023 0.4  0.0 - 0.5 K/mcL Final   • Absolute Basophils 05/09/2023 0.0  0.0 - 0.3 K/mcL Final   • Absolute Immature Granulocytes 05/09/2023 0.1  0.0 - 0.2 K/mcL Final       Orders Placed This Encounter   • SERVICE TO DERMATOLOGY     PLAN  1. Skin lesion of left arm  Likely, to be basal cell carcinoma.  Discussed treatment options.  Provided referral to dermatology.    2. Pulmonary hypertension (CMD)  Explained he has too much pressure inside the lungs.  Recommended discontinuing Hydralazine.  Continue current medications which include Amlodipine, Metolazone, Torsemide, Spironolactone, Metoprolol XL as directed.  Recommended 2000 mg sodium restriction, and 60 oz liquid intake restriction daily.  Follow-up in 2 weeks for a nurse visit to recheck his blood pressure while being off of the Hydralazine.  Suggested using a 225 fine  sandpaper, and moisturize.  Informed he can discontinue iron supplements.     3. Morbid obesity (CMD)  Appreciated on weight loss.  Encouraged weight loss.  Recommended following a healthy diet, and discussed 1600 calorie restriction.  Discussed the possibility of starting Ozempic or Wegovy. Benefits explained.    4. Sleep apnea.  Suspected to have sleep apnea based on his cardiac ultrasound.  Advised pulmonary evaluation for KIMBERLY.  Discussed that weight loss may prove beneficial.     Refer to orders.  Medical compliance with plan discussed and risks of non-compliance reviewed.  Patient education completed on disease process, etiology & prognosis.  Proper usage and side effects of medications reviewed & discussed.  Return to clinic as clinically indicated as discussed with the patient who verbalized understanding of the plan and is in agreement with the plan.    I,  Dr. Nilsa Marie, have created a visit summary document based on the audio recording between   Wilfredo Richter DO and this patient for the physician to review, edit as needed, and authenticate.    Creation Date: 6/2/2023     Addendum  =====------=====------=====------=====------=====------=====------=====------=====------=====------=====------=====------=====------=====------=====------=====------=====------=====------=====------=====------     I have reviewed and edited the visit summary above and attest that it is accurate.     With the below changes        Date of Visit:  6/1/2023  Patient Name:  Sang Contreras  Medical Record Number:  [id number]  YOB: 1959     Objective:  Vital Most Recent Value   Temperature 98 °F (36.7 °C)   Pulse 76   Respiratory 20   Blood Pressure 124/78   Pulse Oximetry     O2        Vital Most Recent Value   Weight 136.1 kg (300 lb)   Height 5' 7\" (170.2 cm)          BP Readings from Last 3 Encounters:   06/01/23 124/78   05/09/23 126/82   04/22/21 (!) 144/84          Wt Readings from Last 3 Encounters:   06/01/23 136.1 kg (300 lb)   05/09/23 (!) 150.6 kg (332 lb)   04/20/21 (!) 142.9 kg (315 lb)         Lab Review:        Lab Results   Component Value Date     HGBA1C 5.9 (H) 05/09/2023     SODIUM 143 05/09/2023     POTASSIUM 4.0 05/09/2023     CHLORIDE 107 05/09/2023     CO2 27 05/09/2023     BUN 23 (H) 05/09/2023     CREATININE 1.23 (H) 05/09/2023     WBC 10.4 05/09/2023     HGB 11.7 (L) 05/09/2023     HCT 39.2 05/09/2023      05/09/2023     TSH 4.119 05/09/2023     CHOLESTEROL 139 05/09/2023     HDL 40 05/09/2023     CHOHDL 3.5 05/09/2023     TRIGLYCERIDE 99 05/09/2023     CALCLDL 79 05/09/2023     INR 1.2 03/07/2020     PSA 0.38 05/09/2023     PSA 0.76 03/18/2021      No results found for: 5GLYH     Assessment & Plan:   Sang was seen today for follow-up.     Diagnoses and all orders for this visit:     Skin lesion of left arm  -     SERVICE TO DERMATOLOGY     Pulmonary hypertension (CMD)     Morbid obesity (CMD)           Down 30+ lbs over this past  month  Update labs  Continued medications except the hydralazine, he is only doing 50mg once a day, his blood pressures will probably be fine without it, but back for nursing appt in 2 weeks to recheck off of the hydralazine     He is waiting till August when he goes on to medicare to f/u with most of his follow up including  1) Pulm for sleep evaluation/KIMBERLY evaluation  2) CT chest for f/u on the lung nodules and changes  3) weight loss pursuit with wegovy or ozempic     Maybe a BCC of the L forearm, re-evaluate in the next month --> referral to derm     S/p LEFT INGUINAL HERNIA REPAIR WITH MESH  4/7/2021 Dr. Cisse     Chronic Hypokalemia  - continues the spironolactone  - scheduled 20meq BID     CKD stage 2   - following BMP     Pulmonary hypertension with right ventricular failure  3/8/2020 ECHO:  Severe pulmonary hypertension, RVSP 76.9 mmHg.  Severely increased right ventricular size. Moderately decreased right ventricular systolic function.  Normal LV size and systolic function. LVEF 66%  Septal flattening consistent with right ventricular pressure and volume overload.  No pericardial effusion.  Compared to prior study, RVSP is severely elevated and RV is dysfunctional  Critical results were conveyed to the referring provider.  3/12/2020 Right Heart Cath  Interpretation:  Moderate pulmonary hypertension (mPA 35, TPG 27, PVR 3.6) in the setting of acute hypoxic respiratory distress due to Influenza A.   No previous study for comparison.  6/1/2020 ECHO:  Normal LV systolic function  Normal right ventricular size and systolic function, RVSP 48.1 mmHg.  Tracetomild  tricuspid valve regurgitation.  -------------------------------------------------------------------------------------------------------------------------------------------  - Advanced heart failure outpatient follow-up  - continues metolazone 2.5mg M/W/F  - continuesthe torsemide 60mg daily   - continues spironolactone 25 mg  daily  - continues hydralazine 50 mg t.i.d. oral --> discontinued 6/1/2023  - metoprolol 100mg XL daily  - recommend 2000 mg sodium restriction and 60 fluid oz liquid intake restriction daily     COPD/emphysema  - just based off of smoking history patient undoubtedly has COPD  - Marcus p.r.n.  - discontinue incruse INH daily, since he does not use at home      Lung nodule  3/9/2021 CT abdomen pelvis w/: Right middle lobe scarring. 2 mm nodule within the right lower lobe on series 302 image 19.  - needs CT of the chest as an outpatient, ordered     Cardiomyopathy with preserved ejection fraction  - continues the aspirin  - continues the atorvastatin  - continues metolazone 2.5mg M/W/F  - continuesthe torsemide 60mg daily   - continues spironolactone 25 mg daily  - continues hydralazine 50 mg t.i.d. oral   - metoprolol 100mg XL daily     Hypertension  - continues metolazone 2.5mg M/W/F  - continuesthe torsemide 60mg daily   - continues spironolactone 25 mg daily  - continues hydralazine 50 mg t.i.d. oral   - metoprolol 100mg XL daily     JOSIAH positive, reflex with slight elevation of the ribonucleoprotein protein antibody  - no specific follow up at this time     Tobacco abuse  - tobacco cessation, he has been quit for 3 years (2020 he states)     Morbid obesity  - weight loss encouragement, dietary changes  - 1600 calorie restriction discussed     Osteoarthritis  - history of bilateral hip replacements     Impaired fasting glucose  - last hemoglobin A1 c 09/24/2018 5.2, updating     Anxiety  - empathetic interventions     Body mass index is 46.99 kg/m².     DO Elif Bajwa Physician:  Family Medicine  [place] [address]

## 2024-09-16 NOTE — CONSULT LETTER
[Dear  ___] : Dear  [unfilled], [Consult Letter:] : I had the pleasure of evaluating your patient, [unfilled]. [Please see my note below.] : Please see my note below. [Consult Closing:] : Thank you very much for allowing me to participate in the care of this patient.  If you have any questions, please do not hesitate to contact me. [Sincerely,] : Sincerely, [FreeTextEntry3] : Ace Badillo M.D. [DrJac  ___] : Dr. CAMPOS

## 2024-09-16 NOTE — PHYSICAL EXAM
[Alert] : alert [Normal Voice/Communication] : normal voice/communication [No Acute Distress] : no acute distress [Well Developed] : well developed [Well Nourished] : well nourished [None] : no edema [Bowel Sounds] : normal bowel sounds [Abdomen Tenderness] : non-tender [No Masses] : no abdominal mass palpated [Abdomen Soft] : soft [] : no hepatosplenomegaly [No Hernia] : no hernia [Normal Sphincter Tone] : normal sphincter tone [No External Hemorrhoid] : no external hemorrhoids [Inguinal Lymph Nodes Enlarged Bilaterally] : no inguinal lymphadenopathy [Normal] : oriented to person, place, and time [Occult Blood] : negative occult blood [FIT Test] : negative FIT test [de-identified] : moderately overweight [de-identified] : surgical scars [de-identified] : mole on each leg

## 2024-09-16 NOTE — HISTORY OF PRESENT ILLNESS
[FreeTextEntry1] : Haydee presents for consideration of colonoscopy.  Haydee has had long-standing reflux, well-controlled on PPI; EGD in the remote past was reportedly unremarkable.  From time to time, there has been episodic rectal bleeding attributed to hemorrhoids.  His aunt diagnosed with colon cancer in her 60s.

## 2024-10-03 ENCOUNTER — APPOINTMENT (OUTPATIENT)
Dept: DERMATOLOGY | Facility: CLINIC | Age: 54
End: 2024-10-03
Payer: COMMERCIAL

## 2024-10-03 VITALS — BODY MASS INDEX: 29.99 KG/M2 | WEIGHT: 180 LBS | HEIGHT: 65 IN

## 2024-10-03 DIAGNOSIS — L82.1 OTHER SEBORRHEIC KERATOSIS: ICD-10-CM

## 2024-10-03 DIAGNOSIS — L91.8 OTHER HYPERTROPHIC DISORDERS OF THE SKIN: ICD-10-CM

## 2024-10-03 DIAGNOSIS — D22.9 MELANOCYTIC NEVI, UNSPECIFIED: ICD-10-CM

## 2024-10-03 DIAGNOSIS — L30.9 DERMATITIS, UNSPECIFIED: ICD-10-CM

## 2024-10-03 DIAGNOSIS — Z12.83 ENCOUNTER FOR SCREENING FOR MALIGNANT NEOPLASM OF SKIN: ICD-10-CM

## 2024-10-03 PROCEDURE — 99204 OFFICE O/P NEW MOD 45 MIN: CPT

## 2024-10-03 RX ORDER — TRIAMCINOLONE ACETONIDE 1 MG/G
0.1 CREAM TOPICAL
Qty: 1 | Refills: 1 | Status: ACTIVE | COMMUNITY
Start: 2024-10-03 | End: 1900-01-01

## 2024-10-03 NOTE — PHYSICAL EXAM
[Alert] : alert [Oriented x 3] : ~L oriented x 3 [Well Nourished] : well nourished [Conjunctiva Non-injected] : conjunctiva non-injected [No Visual Lymphadenopathy] : no visual  lymphadenopathy [No Clubbing] : no clubbing [No Edema] : no edema [No Bromhidrosis] : no bromhidrosis [No Chromhidrosis] : no chromhidrosis [FreeTextEntry3] : The patient is well-appearing, in no acute distress, alert and oriented x 3. Mood and affect are normal. A complete cutaneous examination of the scalp, face, neck, chest, abdomen, back, bilateral arms, bilateral legs, buttocks, digits, nails, eyelids, conjunctiva and lips reveals the following significant findings: -Tan to dark brown macules on the trunk and extremities with no concerning dermoscopic features  -Stuck-on and pedunculated papules on the neck and axilla -Mild erythema on the L chest

## 2024-10-03 NOTE — ASSESSMENT
[FreeTextEntry1] : #Dermatitis - ddx includes irritant derm v. ?neurogenic pruritus given location in the context of top surgery Diagnosis reviewed Trial of TAC cream BID x 2 weeks PRN flare Marcellus emollient use reviewed  #Nevi None c/f malignancy -Sun protection was discussed. The proper use of broad-spectrum UVB/UVA sunscreens with SPF 30 or greater was reviewed and the need for re-application after swimming or sweating or 3-4 hours was emphasized. We talked about judicious use of clothing and avoidance of peak periods of sun exposure. I made the patient aware of the need for year-round protection. ABCDEs of melanoma were also reviewed. -Self-skin checks were also reviewed  -Counseled patient to monitor for changes   #SK and Acrochordon -Education, no intervention  -Counseled patient to notify us of any changes  -Discussed cosmetic removal. Patient to consider  #TBSE -ABCDEs of melanoma, sun safety with OTC SPF 30+, and self-skin checks reviewed -Counseled patient to notify us of any changing lesions -RTC 12 months, sooner PRN

## 2024-10-03 NOTE — HISTORY OF PRESENT ILLNESS
[FreeTextEntry1] : spots on the skin, rash on the chest [de-identified] : 53M with no personal hx of skin cancer here for  1) skin tags on the neck and axilla 2) itchy rash on the chest. s/p top surgery

## 2024-12-02 ENCOUNTER — APPOINTMENT (OUTPATIENT)
Dept: GASTROENTEROLOGY | Facility: CLINIC | Age: 54
End: 2024-12-02

## 2024-12-02 ENCOUNTER — APPOINTMENT (OUTPATIENT)
Dept: PULMONOLOGY | Facility: CLINIC | Age: 54
End: 2024-12-02